# Patient Record
Sex: MALE | Race: WHITE | NOT HISPANIC OR LATINO | URBAN - METROPOLITAN AREA
[De-identification: names, ages, dates, MRNs, and addresses within clinical notes are randomized per-mention and may not be internally consistent; named-entity substitution may affect disease eponyms.]

---

## 2022-12-20 ENCOUNTER — HOSPITAL ENCOUNTER (INPATIENT)
Facility: REHABILITATION | Age: 32
LOS: 8 days | DRG: 560 | End: 2022-12-28
Attending: PHYSICAL MEDICINE & REHABILITATION | Admitting: PHYSICAL MEDICINE & REHABILITATION
Payer: MEDICAID

## 2022-12-20 DIAGNOSIS — Z87.81 S/P LEFT HIP FRACTURE: ICD-10-CM

## 2022-12-20 DIAGNOSIS — Z87.898 HISTORY OF SEIZURES: ICD-10-CM

## 2022-12-20 DIAGNOSIS — G80.8 CEREBRAL PALSY, HEMIPLEGIC (HCC): ICD-10-CM

## 2022-12-20 PROBLEM — U07.1 COVID-19: Status: ACTIVE | Noted: 2022-12-20

## 2022-12-20 PROCEDURE — 700102 HCHG RX REV CODE 250 W/ 637 OVERRIDE(OP): Performed by: PHYSICAL MEDICINE & REHABILITATION

## 2022-12-20 PROCEDURE — 99223 1ST HOSP IP/OBS HIGH 75: CPT | Performed by: PHYSICAL MEDICINE & REHABILITATION

## 2022-12-20 PROCEDURE — 770010 HCHG ROOM/CARE - REHAB SEMI PRIVAT*

## 2022-12-20 PROCEDURE — 8E0ZXY6 ISOLATION: ICD-10-PCS | Performed by: PHYSICAL MEDICINE & REHABILITATION

## 2022-12-20 PROCEDURE — 94760 N-INVAS EAR/PLS OXIMETRY 1: CPT

## 2022-12-20 PROCEDURE — 700111 HCHG RX REV CODE 636 W/ 250 OVERRIDE (IP): Performed by: PHYSICAL MEDICINE & REHABILITATION

## 2022-12-20 PROCEDURE — A9270 NON-COVERED ITEM OR SERVICE: HCPCS | Performed by: PHYSICAL MEDICINE & REHABILITATION

## 2022-12-20 RX ORDER — ACETAMINOPHEN 500 MG
1000 TABLET ORAL EVERY 6 HOURS
Status: DISCONTINUED | OUTPATIENT
Start: 2022-12-20 | End: 2022-12-21

## 2022-12-20 RX ORDER — ARIPIPRAZOLE 10 MG/1
10 TABLET ORAL DAILY
Status: DISCONTINUED | OUTPATIENT
Start: 2022-12-21 | End: 2022-12-28 | Stop reason: HOSPADM

## 2022-12-20 RX ORDER — BENZTROPINE MESYLATE 1 MG/1
2 TABLET ORAL
Status: DISCONTINUED | OUTPATIENT
Start: 2022-12-20 | End: 2022-12-28 | Stop reason: HOSPADM

## 2022-12-20 RX ORDER — OXCARBAZEPINE 300 MG/1
900 TABLET, FILM COATED ORAL DAILY
Status: DISCONTINUED | OUTPATIENT
Start: 2022-12-21 | End: 2022-12-28 | Stop reason: HOSPADM

## 2022-12-20 RX ORDER — OMEPRAZOLE 20 MG/1
20 CAPSULE, DELAYED RELEASE ORAL DAILY
Status: DISCONTINUED | OUTPATIENT
Start: 2022-12-20 | End: 2022-12-28 | Stop reason: HOSPADM

## 2022-12-20 RX ORDER — LEVETIRACETAM 500 MG/5ML
500 INJECTION, SOLUTION, CONCENTRATE INTRAVENOUS
Status: DISCONTINUED | OUTPATIENT
Start: 2022-12-20 | End: 2022-12-28 | Stop reason: HOSPADM

## 2022-12-20 RX ORDER — POLYETHYLENE GLYCOL 3350 17 G/17G
1 POWDER, FOR SOLUTION ORAL
Status: DISCONTINUED | OUTPATIENT
Start: 2022-12-20 | End: 2022-12-28 | Stop reason: HOSPADM

## 2022-12-20 RX ORDER — OXYCODONE HYDROCHLORIDE 5 MG/1
2.5 TABLET ORAL
Status: DISCONTINUED | OUTPATIENT
Start: 2022-12-20 | End: 2022-12-28 | Stop reason: HOSPADM

## 2022-12-20 RX ORDER — ENOXAPARIN SODIUM 100 MG/ML
40 INJECTION SUBCUTANEOUS
Status: DISCONTINUED | OUTPATIENT
Start: 2022-12-20 | End: 2022-12-28 | Stop reason: HOSPADM

## 2022-12-20 RX ORDER — GABAPENTIN 300 MG/1
300 CAPSULE ORAL 3 TIMES DAILY
Status: DISCONTINUED | OUTPATIENT
Start: 2022-12-21 | End: 2022-12-27

## 2022-12-20 RX ORDER — OXYCODONE HYDROCHLORIDE 5 MG/1
5 TABLET ORAL
Status: DISCONTINUED | OUTPATIENT
Start: 2022-12-20 | End: 2022-12-28 | Stop reason: HOSPADM

## 2022-12-20 RX ORDER — ACETAMINOPHEN 500 MG
1000 TABLET ORAL EVERY 6 HOURS PRN
Status: DISCONTINUED | OUTPATIENT
Start: 2022-12-25 | End: 2022-12-21

## 2022-12-20 RX ORDER — ONDANSETRON 2 MG/ML
4 INJECTION INTRAMUSCULAR; INTRAVENOUS 4 TIMES DAILY PRN
Status: DISCONTINUED | OUTPATIENT
Start: 2022-12-20 | End: 2022-12-28 | Stop reason: HOSPADM

## 2022-12-20 RX ORDER — CITALOPRAM 20 MG/1
40 TABLET ORAL DAILY
Status: DISCONTINUED | OUTPATIENT
Start: 2022-12-21 | End: 2022-12-28 | Stop reason: HOSPADM

## 2022-12-20 RX ORDER — BISACODYL 10 MG
10 SUPPOSITORY, RECTAL RECTAL
Status: DISCONTINUED | OUTPATIENT
Start: 2022-12-20 | End: 2022-12-28 | Stop reason: HOSPADM

## 2022-12-20 RX ORDER — AMOXICILLIN 250 MG
2 CAPSULE ORAL 2 TIMES DAILY
Status: DISCONTINUED | OUTPATIENT
Start: 2022-12-20 | End: 2022-12-28 | Stop reason: HOSPADM

## 2022-12-20 RX ORDER — ONDANSETRON 4 MG/1
4 TABLET, ORALLY DISINTEGRATING ORAL 4 TIMES DAILY PRN
Status: DISCONTINUED | OUTPATIENT
Start: 2022-12-20 | End: 2022-12-28 | Stop reason: HOSPADM

## 2022-12-20 RX ORDER — CYCLOBENZAPRINE HCL 10 MG
10 TABLET ORAL 3 TIMES DAILY
Status: DISCONTINUED | OUTPATIENT
Start: 2022-12-20 | End: 2022-12-28 | Stop reason: HOSPADM

## 2022-12-20 RX ORDER — TRAZODONE HYDROCHLORIDE 50 MG/1
50 TABLET ORAL
Status: DISCONTINUED | OUTPATIENT
Start: 2022-12-20 | End: 2022-12-28 | Stop reason: HOSPADM

## 2022-12-20 RX ADMIN — ENOXAPARIN SODIUM 40 MG: 40 INJECTION SUBCUTANEOUS at 21:35

## 2022-12-20 RX ADMIN — CYCLOBENZAPRINE 10 MG: 10 TABLET, FILM COATED ORAL at 21:35

## 2022-12-20 RX ADMIN — TRAZODONE HYDROCHLORIDE 50 MG: 50 TABLET ORAL at 21:34

## 2022-12-20 RX ADMIN — OMEPRAZOLE 20 MG: 20 CAPSULE, DELAYED RELEASE ORAL at 15:42

## 2022-12-20 RX ADMIN — ACETAMINOPHEN 1000 MG: 500 TABLET ORAL at 17:38

## 2022-12-20 RX ADMIN — SENNOSIDES AND DOCUSATE SODIUM 2 TABLET: 50; 8.6 TABLET ORAL at 21:34

## 2022-12-20 RX ADMIN — OXYCODONE 5 MG: 5 TABLET ORAL at 21:34

## 2022-12-20 RX ADMIN — CYCLOBENZAPRINE 10 MG: 10 TABLET, FILM COATED ORAL at 15:42

## 2022-12-20 RX ADMIN — BENZTROPINE MESYLATE 2 MG: 1 TABLET ORAL at 21:34

## 2022-12-20 ASSESSMENT — LIFESTYLE VARIABLES
EVER HAD A DRINK FIRST THING IN THE MORNING TO STEADY YOUR NERVES TO GET RID OF A HANGOVER: NO
HOW MANY TIMES IN THE PAST YEAR HAVE YOU HAD 5 OR MORE DRINKS IN A DAY: 0
CONSUMPTION TOTAL: NEGATIVE
AVERAGE NUMBER OF DAYS PER WEEK YOU HAVE A DRINK CONTAINING ALCOHOL: 0
ALCOHOL_USE: NO
HAVE YOU EVER FELT YOU SHOULD CUT DOWN ON YOUR DRINKING: NO
TOTAL SCORE: 0
TOTAL SCORE: 0
ON A TYPICAL DAY WHEN YOU DRINK ALCOHOL HOW MANY DRINKS DO YOU HAVE: 0
TOTAL SCORE: 0
EVER FELT BAD OR GUILTY ABOUT YOUR DRINKING: NO
HAVE PEOPLE ANNOYED YOU BY CRITICIZING YOUR DRINKING: NO

## 2022-12-20 ASSESSMENT — PATIENT HEALTH QUESTIONNAIRE - PHQ9
1. LITTLE INTEREST OR PLEASURE IN DOING THINGS: NOT AT ALL
SUM OF ALL RESPONSES TO PHQ9 QUESTIONS 1 AND 2: 0
2. FEELING DOWN, DEPRESSED, IRRITABLE, OR HOPELESS: NOT AT ALL

## 2022-12-20 ASSESSMENT — FIBROSIS 4 INDEX: FIB4 SCORE: 0.53

## 2022-12-20 NOTE — H&P
REHABILITATION HISTORY AND PHYSICAL/POST ADMISSION EVALUATION    12/20/2022  11:48 AM  Cheng Foster  [id number] -2   Admission  No admission date for patient encounter.  [facility] Code/Reason for admission: 0008.11 - Orthopaedic Disorders: Status Post Unilateral Hip Fracture   Etiologic diagnosis/problem: S/p left hip fracture  Chief Complaint: Status post left femoral  neck fracture    HPI:  The patient is a 32 y.o. male with a past medical history of seizure disorder, CP with chronic left hemiparesis and spasticity; now admitted for acute inpatient rehabilitation with severe functional debility.  Per documentation, patient sustained a ground-level fall after slipping on ice on 12/13.  Upon evaluation in the emergency department patient had left hip pain.  Images obtained in the emergency department showed a left femoral neck fracture.  Orthopedic surgery was consulted, and patient was taken to the OR for an ORIF of the left hip with percutaneous pinning performed by Dr. Becker on 12/15/2022.  Patient is NWB LLE x6 weeks per Ortho recommendations.  Patient's hospital course has been complicated by mild leukocytosis which resolved on 12/19.  Additionally he has hyponatremia and acute blood loss anemia.  Hemoglobin 12.4 on 12/19.  Patient has been willing to participate with therapy he requires the use of a hemiwalker for transfers.  He has been contact-guard assist for transfers for sit to stand.  Gait has not been attempted since patient is NWB LLE.   At time of discharge from [facility], COVID testing was completed.  Unfortunately patient's COVID test has returned positive.  Patient is still a rehab candidate, his rehab will be done in isolation.    Patient seen and examined at bedside, reports he feel ok. Reports his pain is well controlled. Denies lightheadedness, chest pain, coughing, abdominal pain, loose stools, urinary frequency, fatigue or constipation.  Reports his last BM was today.  Discussed  patient's seizure history with patient and mom.  Per mom, patient's last seizure was approximately a week ago, she reports that his seizures are triggered by emotional distress.  Patient reports that when the patient has a seizure they do not administer any antiepileptics due to the fact that patient is severely allergic to Versed, during last Versed administration patient had a cardiac arrest  Patient's mom reports that they also do not use Ativan for his seizures.  She holds the patient until the seizure subsides.  Reviewed with patient's mom that we will need to add a PRN antiepileptic in the event that patient continues to seize and is not subsiding, will discussed with pharmacy options for PRN  antiepileptic that is not Versed or Ativan    Patient was evaluated by Rehab Medicine physician and Physical Therapy and Occupational Therapy and determined to be appropriate for acute inpatient rehab and was transferred to [facility] on No admission date for patient encounter..    With this acute therapeutic intervention, this patient hopes to improve his functional status, and return to independent living with the supportive care of family.    REVIEW OF SYSTEMS:     A complete review of systems was performed and was negative in detail with the exception of items mentioned elsewhere in this document.    PMH:  History reviewed. No pertinent past medical history.    PSH:  History reviewed. No pertinent surgical history.    History reviewed. No pertinent family history.     MEDICATIONS:  Scheduled Medications   Medication Dose Frequency    Pharmacy Consult Request  1 Each PHARMACY TO DOSE    senna-docusate  2 Tablet BID    omeprazole  20 mg DAILY    acetaminophen  1,000 mg Q6HRS    enoxaparin  40 mg QHS    cyclobenzaprine  10 mg TID    benztropine  2 mg QHS    [START ON 12/21/2022] ARIPiprazole  10 mg DAILY    [START ON 12/21/2022] citalopram  40 mg DAILY    [START ON 12/21/2022] OXcarbazepine  900 mg  "DAILY       ALLERGIES:  Codeine and Versed    PSYCHOSOCIAL HISTORY:  Lives in [place] in a one-story house with one-step to enter.  Lives with his brother and his mom.  LEVEL OF FUNCTION PRIOR TO DISABILTY:  Independent with mobility and ADLs.    LEVEL OF FUNCTION PRIOR TO ADMISSION to [facility]:  PT:  12/1 18 PT note: Contact-guard assist for transfers, contact-guard assist with hemiwalker to transfer to chair.  Gait not assessed.  OT:  12/18 OT note: Min assist for donning and doffing underwear, contact-guard assist for transfers,    SLP:    No SLP note to review    CURRENT LEVEL OF FUNCTION:   Same as level of function prior to admission to [facility]    PHYSICAL EXAM:   Physical Exam:  Vitals: Ht 1.93 m (6' 4\")   Wt 72.6 kg (160 lb 0.9 oz)   Gen: NAD laying comfortably in bed  Head:  NC/AT  Eyes/ Nose/ Mouth: PERRLA, moist mucous membranes  Cardio: RRR, good distal perfusion, warm extremities  Pulm: normal respiratory effort, no cyanosis   Abd: Soft NTND, negative borborygmi   Ext: No peripheral edema. No calf tenderness. No clubbing.  MSK: Left upper extremity with flexion contracture, left lower extremity with equinus foot and PF contracture     Mental status:  A&Ox4 (person, place, date, situation) answers questions appropriately follows commands  Speech: fluent, no aphasia or dysarthria    CRANIAL NERVES:  2,3: visual acuity grossly intact, PERRL  3,4,6: EOMI bilaterally, no nystagmus or diplopia  5: sensation intact to light touch bilaterally and symmetric  7: no facial asymmetry  8: hearing grossly intact      Motor:      Upper Extremity  Myotome R L   Shoulder flexion C5 5/5 3/5   Elbow flexion C5 5/5 3/5   Wrist extension C6 5/5 0/5   Elbow extension C7 5/5 0/5   Finger flexion C8 5/5 0/5   Finger abduction T1 5/5 0/5     Lower Extremity Myotome R L   Hip flexion L2 5/5 2, limited by pain/5   Knee extension L3 5/5 1/5   Ankle dorsiflexion L4 5/5 0/5   Toe " extension L5 5/5 1/5   Ankle plantarflexion S1 5/5 1/5       Sensory:   intact to light touch through out      Tone: MAS 3 left upper extremity biceps, MAS 4 wrist flexors and finger flexors, flexion contracture.  MAS 4 gastroc, plantar flexion contracture    Coordination:   intact finger to nose bilaterally  intact fine motor with fingers bilaterally        RADIOLOGY:    12/13 hip and pelvis x-ray  Minimally displaced fracture of the left femoral neck.    LABS:      12/19/2022 CBC  WBC 6.7  Hemoglobin 12.4  Hematocrit 36.8  Platelets 249    12/16/2022 CMP sodium 137  Potassium 4.0  Creatinine 0.77  Glucose 133  CO2 25  Chloride 105  Calcium 8.0        PRIMARY REHAB DIAGNOSIS:    This patient is a 32 y.o. male admitted for acute inpatient rehabilitation with S/p left hip fracture.    IMPAIRMENTS:   Cognitive  ADLs/IADLs  Mobility    SECONDARY DIAGNOSIS/MEDICAL CO-MORBIDITIES AFFECTING FUNCTION:  Anemia  Hyponatremia  Cerebral palsy  Left-sided hemiplegia with spasticity    RELEVANT CHANGES SINCE PREADMISSION EVALUATION:    Status unchanged    The patient's rehabilitation potential is Excellent  The patient's medical prognosis is excellent    PLAN:   Discussion and Recommendations, discussed with the patient and/or family:   1. The patient requires an acute inpatient rehabilitation program with a coordinated program of care at an intensity and frequency not available at a lower level of care. This recommendation is substantiated by the patient's medical physicians who recommend that the patient's intervention and assessment of medical issues needs to be done at an acute level of care for patient's safety and maximum outcome.     2. A coordinated program of care will be supplied by an interdisciplinary team of physical therapy, occupational therapy, rehab physician, rehab nursing, and, if needed, speech therapy and rehab psychology. Rehab team presents a patient-specific rehabilitation and education program  concentrating on prevention of future problems related to accessibility, mobility, skin, bowel, bladder, sexuality, and psychosocial and medical/surgical problems.     3. Need for Rehabilitation Physician: The rehab physician will be evaluating the patient on a multi-weekly basis to help coordinate the program of care. The rehab physician communicates between medical physicians, therapists, and nurses to maximize the patient's potential outcome. Specific areas in which the rehab physician will be providing daily assessment include the following:   A. Assessing the patient's heart rate and blood pressure response (vitals monitoring) to activity and making adjustments in medications or conservative measures as needed.   B. The rehab physician will be assessing the frequency at which the program can be increased to allow the patient to reach optimal functional outcome.   C. The rehab physician will also provide assessments in daily skin care, especially in light of patient's impairments in mobility.   D. The rehab physician will provide special expertise in understanding how to work with functional impairment and recommend appropriate interventions, compensatory techniques, and education that will facilitate the patient's outcome.     4. Rehab R.N.   The rehab RN will be working with patient to carry over in room mobility and activities of daily living when the patient is not in 3 hours of skilled therapy. Rehab nursing will be working in conjunction with rehab physician to address all the medical issues above and continue to assess laboratory work and discuss abnormalities with the treating physicians, assess vitals, and response to activity, and discuss and report abnormalities with the rehab physician. Rehab RN will also continue daily skin care, supervise bladder/bowel program, instruct in medication administration, and ensure patient safety.     5. Therapies to treat at intensity and frequency of (may change after  completion of evaluation by all therapeutic disciplines):       PT:  Physical therapy to address mobility, transfer, gait training and evaluation for adaptive equipment needs 1hour/day at least 5 days/week for the duration of the ELOS (see below)       OT:  Occupational therapy to address ADLs, self-care, home management training, functional mobility/transfers and assistive device evaluation, and community re-integration 1hour/day at least 5 days/week for the duration of the ELOS (see below).        ST/Dysphagia:  Speech therapy to address speech, language, and cognitive deficits as well as swallowing difficulties with retraining/dysphagia management and community re-integration with comprehension, expression, cognitive training 1hour/day at least 5 days/week for the duration of the ELOS (see below).     6. Medical management / Rehabilitation Issues/Adverse Potential affecting function as part of rehabilitation plan.    Left hip fracture  - Sustained during ground-level fall with a slip on ice  - Status post ORIF percutaneous pinning by Dr. Becker on 12/15/2022  - NWB LLE x6 weeks  - Initiate comprehensive [facility] level therapy with 3 hours of therapy 5 days a week with services from PT/OT    History of seizures  - Continuing patient's home dose Trileptal 900 mg daily  - Patient is allergic to Versed, has anaphylaxis response to benzodiazepines, including cardiac arrest  - Patient's mom tells me that at home if patient has a seizure they the patient until it subsides, they do not last longer than 5 minutes, mom reports history of focal type seizures including staring off and recovering shortly  - I have discussed patient's allergy to benzodiazepines with pharmacy, we will plan for IM Keppra for seizure prophylaxis, unfortunately this is not in stock this evening, will be available 12/21  - In the event the patient has a seizure on the evening of 12/20, the plan will be to keep the patient safe until seizure subsides,  patient's history of seizures are that they are short-lived and less than 5 minutes    COVID-19  - Incidental COVID-19 finding at time of discharge in anticipation for transferring to rehab  - Patient will need to be in isolation x10 days, would be able to be out of isolation on 12/30  - We will plan for patient's rehab to be in isolation for now, is asymptomatic    Cerebral palsy, with spasticity  - Was previously on Flexeril, is tolerating well    History of depression  - Continue patient's home dose Abilify, Celexa  - May benefit from rehab psychology    Acute blood loss anemia  - Hemoglobin 12.4 on 12/19, down from 14.7  - Check admission CBC    Hyponatremia  - Sodium 132 postoperatively  - Sodium improved to 137, recheck BMP at time of admission    Bowel  - Constipation prevention, scheduled senna   - As needed stool softeners    GI prophylaxis: Omeprazole    DVT prophylaxis: Lovenox      I performed a complete drug regimen review and did not identify any potential clinically significant medication issues.    The patient's CODE STATUS was confirmed as Full on admission, with the patient and/or family at bedside.    REHABILITATION ISSUES/ADVERSE POTENTIAL:  1.  Hip fracture: Patient demonstrates functional deficits in strength, balance, coordination, and ADL's. Patient is admitted to [facility] for comprehensive rehabilitation therapy as described below.   Rehabilitation nursing monitors bowel and bladder control, educates on medication administration, co-morbidities and monitors patient safety.    2.  DVT prophylaxis:  Patient is on Lovenox for anticoagulation upon transfer. Encourage OOB. Monitor daily for signs and symptoms of DVT including but not limited to swelling and pain to prevent the development of DVT that may interfere with therapies.    3.  Pain: No issues with pain currently / Controlled with as needed oral analgesics.    4.  Nutrition/Dysphagia: Dietician monitors nutrient  intake, recommend supplements prn and provide nutrition education to pt/family to promote optimal nutrition for wound healing/recovery.     5.  Bladder/bowel:  Start bowel and bladder program, to prevent constipation, urinary retention (which may lead to UTI), and urinary incontinence (which will impact upon pt's functional independence).   - TV Q3h while awake with post void bladder scans, I&O cath for PVRs >400  - up to commode after meal     6.  Skin/dermal ulcer prophylaxis: Monitor for new skin conditions with q.2 h. turns as required to prevent the development of skin breakdown.     7.  GI prophylaxis: Omeprazole to prevent gastritis or GI bleed that would interfere with therapies.    8. Respiratory therapy: RT performs O2 management prn, breathing retraining, pulmonary hygiene and bronchospasm management prn to optimize participation in therapies.    Pt was seen today for 94 min, and entire time spent in face-to-face contact was >50% in counseling and coordination of care as detailed in A/P above.        GOALS/EXPECTED LEVEL OF FUNCTION BASED ON CURRENT MEDICAL AND FUNCTIONAL STATUS (may change based on patient's medical status and rate of impairment recovery):  Mobility/Gait:   Modified Independent, with wheelchair level  ADLs supervision    DISPOSITION: Discharge to pre-morbid independent living setting with the supportive care of patient's family.      ELOS: 10 to 14 days    Kely Villareal D.O.  Physical Medicine and Rehabilitation

## 2022-12-21 LAB
25(OH)D3 SERPL-MCNC: 25 NG/ML (ref 30–100)
ALBUMIN SERPL BCP-MCNC: 4 G/DL (ref 3.2–4.9)
ALBUMIN/GLOB SERPL: 1.3 G/DL
ALP SERPL-CCNC: 110 U/L (ref 30–99)
ALT SERPL-CCNC: 151 U/L (ref 2–50)
ANION GAP SERPL CALC-SCNC: 7 MMOL/L (ref 7–16)
AST SERPL-CCNC: 114 U/L (ref 12–45)
BASOPHILS # BLD AUTO: 0.6 % (ref 0–1.8)
BASOPHILS # BLD: 0.04 K/UL (ref 0–0.12)
BILIRUB SERPL-MCNC: 0.6 MG/DL (ref 0.1–1.5)
BUN SERPL-MCNC: 14 MG/DL (ref 8–22)
CALCIUM ALBUM COR SERPL-MCNC: 9.3 MG/DL (ref 8.5–10.5)
CALCIUM SERPL-MCNC: 9.3 MG/DL (ref 8.5–10.5)
CHLORIDE SERPL-SCNC: 100 MMOL/L (ref 96–112)
CO2 SERPL-SCNC: 29 MMOL/L (ref 20–33)
CREAT SERPL-MCNC: 0.84 MG/DL (ref 0.5–1.4)
EOSINOPHIL # BLD AUTO: 0.3 K/UL (ref 0–0.51)
EOSINOPHIL NFR BLD: 4.7 % (ref 0–6.9)
ERYTHROCYTE [DISTWIDTH] IN BLOOD BY AUTOMATED COUNT: 41.4 FL (ref 35.9–50)
EST. AVERAGE GLUCOSE BLD GHB EST-MCNC: 120 MG/DL
FLUAV RNA SPEC QL NAA+PROBE: NEGATIVE
FLUBV RNA SPEC QL NAA+PROBE: NEGATIVE
GFR SERPLBLD CREATININE-BSD FMLA CKD-EPI: 120 ML/MIN/1.73 M 2
GLOBULIN SER CALC-MCNC: 3.2 G/DL (ref 1.9–3.5)
GLUCOSE SERPL-MCNC: 96 MG/DL (ref 65–99)
HBA1C MFR BLD: 5.8 % (ref 4–5.6)
HCT VFR BLD AUTO: 43.6 % (ref 42–52)
HGB BLD-MCNC: 14.5 G/DL (ref 14–18)
IMM GRANULOCYTES # BLD AUTO: 0.05 K/UL (ref 0–0.11)
IMM GRANULOCYTES NFR BLD AUTO: 0.8 % (ref 0–0.9)
LYMPHOCYTES # BLD AUTO: 1.75 K/UL (ref 1–4.8)
LYMPHOCYTES NFR BLD: 27.2 % (ref 22–41)
MCH RBC QN AUTO: 31.5 PG (ref 27–33)
MCHC RBC AUTO-ENTMCNC: 33.3 G/DL (ref 33.7–35.3)
MCV RBC AUTO: 94.6 FL (ref 81.4–97.8)
MONOCYTES # BLD AUTO: 0.54 K/UL (ref 0–0.85)
MONOCYTES NFR BLD AUTO: 8.4 % (ref 0–13.4)
NEUTROPHILS # BLD AUTO: 3.75 K/UL (ref 1.82–7.42)
NEUTROPHILS NFR BLD: 58.3 % (ref 44–72)
NRBC # BLD AUTO: 0 K/UL
NRBC BLD-RTO: 0 /100 WBC
PLATELET # BLD AUTO: 309 K/UL (ref 164–446)
PMV BLD AUTO: 9.7 FL (ref 9–12.9)
POTASSIUM SERPL-SCNC: 4 MMOL/L (ref 3.6–5.5)
PROT SERPL-MCNC: 7.2 G/DL (ref 6–8.2)
RBC # BLD AUTO: 4.61 M/UL (ref 4.7–6.1)
RSV RNA SPEC QL NAA+PROBE: NEGATIVE
SARS-COV-2 RNA RESP QL NAA+PROBE: NOTDETECTED
SODIUM SERPL-SCNC: 136 MMOL/L (ref 135–145)
SPECIMEN SOURCE: NORMAL
WBC # BLD AUTO: 6.4 K/UL (ref 4.8–10.8)

## 2022-12-21 PROCEDURE — 80053 COMPREHEN METABOLIC PANEL: CPT

## 2022-12-21 PROCEDURE — 83036 HEMOGLOBIN GLYCOSYLATED A1C: CPT

## 2022-12-21 PROCEDURE — 700111 HCHG RX REV CODE 636 W/ 250 OVERRIDE (IP): Performed by: PHYSICAL MEDICINE & REHABILITATION

## 2022-12-21 PROCEDURE — 0241U HCHG SARS-COV-2 COVID-19 NFCT DS RESP RNA 4 TRGT MIC: CPT

## 2022-12-21 PROCEDURE — 97535 SELF CARE MNGMENT TRAINING: CPT

## 2022-12-21 PROCEDURE — 36415 COLL VENOUS BLD VENIPUNCTURE: CPT

## 2022-12-21 PROCEDURE — A9270 NON-COVERED ITEM OR SERVICE: HCPCS | Performed by: PHYSICAL MEDICINE & REHABILITATION

## 2022-12-21 PROCEDURE — 82306 VITAMIN D 25 HYDROXY: CPT

## 2022-12-21 PROCEDURE — 85025 COMPLETE CBC W/AUTO DIFF WBC: CPT

## 2022-12-21 PROCEDURE — 97116 GAIT TRAINING THERAPY: CPT

## 2022-12-21 PROCEDURE — 97166 OT EVAL MOD COMPLEX 45 MIN: CPT

## 2022-12-21 PROCEDURE — 770010 HCHG ROOM/CARE - REHAB SEMI PRIVAT*

## 2022-12-21 PROCEDURE — 97112 NEUROMUSCULAR REEDUCATION: CPT

## 2022-12-21 PROCEDURE — 99233 SBSQ HOSP IP/OBS HIGH 50: CPT | Performed by: PHYSICAL MEDICINE & REHABILITATION

## 2022-12-21 PROCEDURE — 700102 HCHG RX REV CODE 250 W/ 637 OVERRIDE(OP): Performed by: PHYSICAL MEDICINE & REHABILITATION

## 2022-12-21 PROCEDURE — 97162 PT EVAL MOD COMPLEX 30 MIN: CPT

## 2022-12-21 RX ORDER — VITAMIN B COMPLEX
1000 TABLET ORAL DAILY
Status: DISCONTINUED | OUTPATIENT
Start: 2022-12-21 | End: 2022-12-28 | Stop reason: HOSPADM

## 2022-12-21 RX ORDER — ACETAMINOPHEN 500 MG
500 TABLET ORAL EVERY 6 HOURS PRN
Status: DISCONTINUED | OUTPATIENT
Start: 2022-12-21 | End: 2022-12-28 | Stop reason: HOSPADM

## 2022-12-21 RX ADMIN — CYCLOBENZAPRINE 10 MG: 10 TABLET, FILM COATED ORAL at 14:09

## 2022-12-21 RX ADMIN — ACETAMINOPHEN 500 MG: 500 TABLET ORAL at 10:47

## 2022-12-21 RX ADMIN — ARIPIPRAZOLE 10 MG: 10 TABLET ORAL at 08:55

## 2022-12-21 RX ADMIN — Medication 1000 UNITS: at 10:47

## 2022-12-21 RX ADMIN — CITALOPRAM HYDROBROMIDE 40 MG: 20 TABLET ORAL at 08:55

## 2022-12-21 RX ADMIN — OXYCODONE 2.5 MG: 5 TABLET ORAL at 15:51

## 2022-12-21 RX ADMIN — GABAPENTIN 300 MG: 300 CAPSULE ORAL at 14:09

## 2022-12-21 RX ADMIN — OMEPRAZOLE 20 MG: 20 CAPSULE, DELAYED RELEASE ORAL at 08:54

## 2022-12-21 RX ADMIN — ENOXAPARIN SODIUM 40 MG: 40 INJECTION SUBCUTANEOUS at 19:37

## 2022-12-21 RX ADMIN — SENNOSIDES AND DOCUSATE SODIUM 2 TABLET: 50; 8.6 TABLET ORAL at 08:54

## 2022-12-21 RX ADMIN — CYCLOBENZAPRINE 10 MG: 10 TABLET, FILM COATED ORAL at 08:53

## 2022-12-21 RX ADMIN — CYCLOBENZAPRINE 10 MG: 10 TABLET, FILM COATED ORAL at 19:36

## 2022-12-21 RX ADMIN — BENZTROPINE MESYLATE 2 MG: 1 TABLET ORAL at 19:36

## 2022-12-21 RX ADMIN — OXCARBAZEPINE 900 MG: 300 TABLET, FILM COATED ORAL at 08:55

## 2022-12-21 RX ADMIN — ACETAMINOPHEN 1000 MG: 500 TABLET ORAL at 05:46

## 2022-12-21 RX ADMIN — GABAPENTIN 300 MG: 300 CAPSULE ORAL at 08:54

## 2022-12-21 RX ADMIN — GABAPENTIN 300 MG: 300 CAPSULE ORAL at 19:36

## 2022-12-21 SDOH — ECONOMIC STABILITY: TRANSPORTATION INSECURITY
IN THE PAST 12 MONTHS, HAS LACK OF RELIABLE TRANSPORTATION KEPT YOU FROM MEDICAL APPOINTMENTS, MEETINGS, WORK OR FROM GETTING THINGS NEEDED FOR DAILY LIVING?: NO

## 2022-12-21 SDOH — ECONOMIC STABILITY: TRANSPORTATION INSECURITY
IN THE PAST 12 MONTHS, HAS THE LACK OF TRANSPORTATION KEPT YOU FROM MEDICAL APPOINTMENTS OR FROM GETTING MEDICATIONS?: NO

## 2022-12-21 ASSESSMENT — BRIEF INTERVIEW FOR MENTAL STATUS (BIMS)
ASKED TO RECALL BLUE: YES, NO CUE REQUIRED
BIMS SUMMARY SCORE: 15
WHAT MONTH IS IT: ACCURATE WITHIN 5 DAYS
INITIAL REPETITION OF BED BLUE SOCK - FIRST ATTEMPT: 3
ASKED TO RECALL BED: YES, NO CUE REQUIRED
WHAT DAY OF THE WEEK IS IT: CORRECT
ASKED TO RECALL SOCK: YES, NO CUE REQUIRED
WHAT YEAR IS IT: CORRECT

## 2022-12-21 ASSESSMENT — ACTIVITIES OF DAILY LIVING (ADL)
TOILETING: INDEPENDENT
TUB_SHOWER_TRANSFER_DESCRIPTION: ADAPTIVE EQUIPMENT;SHOWER BENCH;INCREASED TIME;SET-UP OF EQUIPMENT;SUPERVISION FOR SAFETY;VERBAL CUEING
TOILET_TRANSFER_DESCRIPTION: ADAPTIVE EQUIPMENT;INCREASED TIME;SET-UP OF EQUIPMENT;SUPERVISION FOR SAFETY;VERBAL CUEING

## 2022-12-21 ASSESSMENT — GAIT ASSESSMENTS
DEVIATION: ANTALGIC;DECREASED BASE OF SUPPORT;BRADYKINETIC
DISTANCE (FEET): 1
ASSISTIVE DEVICE: PLATFORM WALKER
GAIT LEVEL OF ASSIST: MODERATE ASSIST
DISTANCE (FEET): 4
DEVIATION: ANTALGIC;DECREASED BASE OF SUPPORT;BRADYKINETIC
GAIT LEVEL OF ASSIST: MAXIMAL ASSIST
ASSISTIVE DEVICE: PLATFORM WALKER

## 2022-12-21 NOTE — FLOWSHEET NOTE
12/20/22 1855   Protocol Assessment   Initial Assessment Yes   Patient History   Pulmonary Diagnosis None   Procedures Relevant to Respiratory Status None   Home O2 No   Nocturnal CPAP No   Home Treatments/Frequency No   Protocol Pathways   Protocol Pathways None

## 2022-12-21 NOTE — CARE PLAN
Problem: Pain - Standard  Goal: Alleviation of pain or a reduction in pain to the patient’s comfort goal  Flowsheets (Taken 12/21/2022 0800)  Non Verbal Scale:   Calm   Unlabored Breathing  Pain Rating Scale (NPRS): 0     Problem: Psychosocial  Goal: Patient's level of anxiety will decrease  Flowsheets (Taken 12/21/2022 1451)  Decrease Anxiety Level:   Collaborated with patient to identify and develop coping strategies   Implemented stimuli reduction, calming techniques  Patient Behaviors: Anxious  Note: Patient is very anxious this shift, continuously asking about video games and stating that he is bored. Offered things to do, none interest patient.    The patient is Stable - Low risk of patient condition declining or worsening

## 2022-12-21 NOTE — PROGRESS NOTES
Patient admitted to facility at 1500 via wheelchair; accompanied by hospital transport.  Patient assisted to room and positioned in bed for comfort and safety; call light within reach.  Patient assisted with stowing belongings and oriented to room and facility.  Admission assessment performed and documented in computer.  Admission paperwork completed; signed copies placed in chart.  Will continue to monitor.

## 2022-12-21 NOTE — CARE PLAN
The patient is Watcher - Medium risk of patient condition declining or worsening         Progress made toward(s) clinical / shift goals:    Problem: Pain - Standard  Goal: Alleviation of pain or a reduction in pain to the patient’s comfort goal  Note: Oxycodone 5 mg PO given for C/O left upper leg pain with relief. Scheduled Tylenol at midnight held, pt sleeping soundly, no signs of pain.      Problem: Psychosocial  Goal: Patient's level of anxiety will decrease  Outcome: Progressing  Note: Pt does not appear anxious. Calm, pleasant and cooperative during NOC shift,     Problem: Bowel Elimination  Goal: Patient will participate in bowel management program  Outcome: Progressing     Problem: Seizure Precautions  Goal: Implementation of seizure precautions  Note: No episode of seizure noted. Seizure precautions maintained.       Patient is not progressing towards the following goals:

## 2022-12-21 NOTE — THERAPY
Occupational Therapy   Initial Evaluation     Patient Name: Cheng Foster  Age:  32 y.o., Sex:  male  Medical Record #: [id number]  Today's Date: 12/21/2022     Subjective    Pt resting in bed, agreeable to OT evaluation.      Objective       12/21/22 0701   OT Charge Group   Charges Yes   OT Self Care / ADL (Units) 1   OT Evaluation OT Evaluation Mod   OT Total Time Spent   OT Individual Total Time Spent (Mins) 60   Prior Living Situation   Prior Services None;Home-Independent   Housing / Facility 1 Story House  (in [place])   Steps Into Home   (pt reports a couple of steps; states that his brother can install ramp if needed)   Steps In Home 0   Bathroom Set up Bathtub / Shower Combination;Shower Curtain   Equipment Owned None   Lives with - Patient's Self Care Capacity Other (Comments)  (lives with mom and brother)   Comments pt reports that his mom and brother both work, he was independent with ADLs and functional mobility at baseline.   Prior Level of ADL Function   Self Feeding Independent   Grooming / Hygiene Independent   Bathing Independent   Dressing Independent   Toileting Independent   Prior Level of IADL Function   Medication Management Requires Assist   Laundry Requires Assist   Kitchen Mobility Independent   Finances Requires Assist   Home Management Requires Assist   Shopping Requires Assist   Prior Level Of Mobility Independent Without Device in Community;Independent Without Device in Home   Driving / Transportation Relatives / Others Provide Transportation   Occupation (Pre-Hospital Vocational) Not Employed   Leisure Interests Other (Comments)  (video games)   Comments pt shares IADL tasks w/ mom and brother; is alone during the day when they are at work.   Prior Functioning: Everyday Activities   Self Care Independent   Indoor Mobility (Ambulation) Independent   Stairs Independent   Functional Cognition Needed some help   Prior Device Use None of the given options   Cognition    Orientation Level  "Oriented x 4   Level of Consciousness Alert   ABS (Agitated Behavior Scale)   Agitated Behavior Scale Performed No   Cognitive Pattern Assessment   Cognitive Pattern Assessment Used BIMS   Brief Interview for Mental Status (BIMS)   Repetition of Three Words (First Attempt) 3   Temporal Orientation: Year Correct   Temporal Orientation: Month Accurate within 5 days   Temporal Orientation: Day Correct   Recall: \"Sock\" Yes, no cue required   Recall: \"Blue\" Yes, no cue required   Recall: \"Bed\" Yes, no cue required   BIMS Summary Score 15   Confusion Assessment Method (CAM)   Is there evidence of an acute change in mental status from the patient's baseline? No   Inattention Behavior not present   Disorganized thinking Behavior not present   Altered level of consciousness Behavior not present   Vision Screen   Vision Not tested  (reports no changes/difficulty with vision)   Passive ROM Upper Body   Passive ROM Upper Body X   Comments limited at L elbow/wrist/hand 2/2 spasticity/tone; flexion contractures at L wrist and fingers   Active ROM Upper Body   Active ROM Upper Body  X   Dominant Hand Right   Comments limited at L elbow/wrist/hand 2/2 spasticity/tone   Strength Upper Body   Upper Body Strength  X   Comments WFL RUE; 3-4/5 at L shoulder and elbow; limited at L elbow/wrist/hand 2/2 spasticity/tone   Sensation Upper Body   Upper Extremity Sensation  WDL   Upper Body Muscle Tone   Upper Body Muscle Tone  X   Lt Upper Extremity Muscle Tone Spastic   Balance Assessment   Sitting Balance (Static) Fair   Sitting Balance (Dynamic) Fair   Standing Balance (Static) Poor   Standing Balance (Dynamic) Trace +   Weight Shift Sitting Fair   Weight Shift Standing Absent  (NWB LLE)   Bed Mobility    Supine to Sit Contact Guard Assist   Sit to Stand Minimal Assist   Scooting Contact Guard Assist   Rolling Contact Guard Assist   Coordination Upper Body   Coordination X   Comments WFL RUE; impaired LUE 2/2 tone/spasticity   Eating "   Assistance Needed Set-up / clean-up   Physical Assistance Level No physical assistance   CARE Score - Eating 5   Eating Discharge Goal   Discharge Goal 6   Oral Hygiene   Assistance Needed Set-up / clean-up;Supervision   Physical Assistance Level No physical assistance   CARE Score - Oral Hygiene 4   Oral Hygiene Discharge Goal   Discharge Goal 6   Shower/Bathe Self   Assistance Needed Physical assistance   Physical Assistance Level 26%-50%   CARE Score - Shower/Bathe Self 3   Shower/Bathe Self Discharge Goal   Discharge Goal 4   Upper Body Dressing   Assistance Needed Physical assistance   Physical Assistance Level 25% or less   CARE Score - Upper Body Dressing 3   Upper Body Dressing Discharge Goal   Discharge Goal 6   Lower Body Dressing   Assistance Needed Physical assistance   Physical Assistance Level 51%-75%   CARE Score - Lower Body Dressing 2   Lower Body Dressing Discharge Goal   Discharge Goal 6   Putting On/Taking Off Footwear   Assistance Needed Physical assistance   Physical Assistance Level Total assistance   CARE Score - Putting On/Taking Off Footwear 1   Putting On/Taking Off Footwear Discharge Goal   Discharge Goal 6   Toileting Hygiene   Assistance Needed Physical assistance   Physical Assistance Level 51%-75%   CARE Score - Toileting Hygiene 2   Toileting Hygiene Discharge Goal   Discharge Goal 6   Toilet Transfer   Assistance Needed Physical assistance   Physical Assistance Level 25% or less   CARE Score - Toilet Transfer 3   Toilet Transfer Discharge Goal   Discharge Goal 6   Hearing, Speech, and Vision   Ability to Hear Adequate   Ability to See in Adequate Light Adequate   Expression of Ideas and Wants Without difficulty   Understanding Verbal and Non-Verbal Content Understands   Functional Level of Assist   Eating Supervision   Eating Description Set-up of equipment or meal/tube feeding   Grooming Standby Assist;Seated   Grooming Description Increased time;Set-up of equipment;Supervision  for safety;Seated in wheelchair at sink   Bathing Moderate Assist   Bathing Description Assit wtih lower extremities;Increased time;Set-up of equipment;Supervision for safety;Verbal cueing;Tub bench;Assit with back;Assit with perineal   Upper Body Dressing Minimal Assist   Upper Body Dressing Description Assist with closures  (don [place] shirt w/ assist to manage buttons)   Lower Body Dressing Maximal Assist   Lower Body Dressing Description Assist with threading into pant leg;Increased time;Set-up of equipment;Supervision for safety;Verbal cueing  (max assist to don shorts and tread socks)   Toileting Maximal Assist   Toileting Description Assist to pull pants up;Assist to pull pants down;Assist for standing balance;Increased time;Supervision for safety;Verbal cueing   Toilet Transfers Minimal Assist   Toilet Transfer Description Adaptive equipment;Increased time;Set-up of equipment;Supervision for safety;Verbal cueing  (w/c<>toilet stand pivot using julio walker)   Tub / Shower Transfers Minimal Assist   Tub Shower Transfer Description Adaptive equipment;Shower bench;Increased time;Set-up of equipment;Supervision for safety;Verbal cueing  (w/c<>shower bench stand pivot using julio walker)   Comprehension Independent   Expression Independent   Problem Solving Modified Independent   Problem Solving Description Increased time   Memory Independent   Problem List   Problem List Decreased Active Daily Living Skills;Decreased Homemaking Skills;Decreased Upper Extremity Strength Left;Decreased Upper Extremity AROM Left;Decreased Upper Extremity PROM Left;Decreased Functional Mobility;Decreased Activity Tolerance;Impaired Coordination Left Upper Extremity;Impaired Upper Extremity Tone Left;Impaired Postural Control / Balance  (NWB LLE)   Precautions   Precautions Fall Risk;Non Weight Bearing Left Lower Extremity;Other (See Comments)   Comments hx CP w/ chronic L hemiparesis and spasticity, NWB x6 weeks (starting 12/15),  seizure prec, Covid+   Current Discharge Plan   Current Discharge Plan Return to Prior Living Situation   Benefit    Therapy Benefit Patient Would Benefit from Inpatient Rehab Occupational Therapy to Maximize [place] with ADLs, IADLs and Functional Mobility.   Interdisciplinary Plan of Care Collaboration   IDT Collaboration with  Certified Nursing Assistant;Nursing;Physical Therapist   Patient Position at End of Therapy Seated;Self Releasing Lap Belt Applied;Call Light within Reach;Tray Table within Reach;Phone within Reach   Collaboration Comments CLOF, POC   Equipment Needs   Assistive Device / DME Parallel Bars;Tao Walker;Wheelchair;Tub Transfer Bench;Raised Toilet Seat;Grab Bars In Shower / Tub;Grab Bars By Toilet   Adaptive Equipment Reacher;Sock Aide;Dressing Stick;Long Handled Shoe Horn;Long Handled Sponge   Strengths & Barriers   Strengths Able to follow instructions;Alert and oriented;Effective communication skills;Independent prior level of function;Motivated for self care and independence;Pleasant and cooperative;Supportive family;Willingly participates in therapeutic activities   Barriers Decreased endurance;Fatigue;Home accessibility;Impaired activity tolerance;Impaired balance;Pain;Spasticity  (NWB LLE)       Assessment  Patient is 32 y.o. male with a diagnosis of L hip fx after GLF slipping on ice. Pt underwent ORIF of the left hip with percutaneous pinning performed by Dr. Becker on 12/15/2022.  Patient is NWB LLE x6 weeks per Ortho recommendations.    Additional factors influencing patient status / progress (ie: cognitive factors, co-morbidities, social support, etc): Per H&P pt has past medical history of seizure disorder, CP with chronic left hemiparesis and spasticity.      Pt lives in a [facility] with 1-2 AGUILAR in [place] with his mom and brother. He reports independent PLOF for ADLs and functional mobility without AD. He shares IADL tasks with family. Pt was able to be home alone when his mom  and brother are at work. Pt reports that his brother can install a ramp if needed. He has a tub/shower w/ curtain and owns no equipment. Pt reports that he spends most of his days playing video games, and some of his friends have started a  for him while he is in the hospital.     During OT evaluation pt required max A to SBA with ADLs, and min A for stand pivot txfrs from w/c level. He is functioning below his baseline and presents with pain, impaired balance/standing tolerance, decreased endurance, LUE hemiparesis and spasticity w/ wrist and finger flexion contractures. OT services recommended to maximize pt's safety and independence with ADLs and functional mobility prior to d/c home with family support.     Plan  Recommend Occupational Therapy  minutes per day 5-7 days per week for 10 days for the following treatments:  OT Self Care/ADL, OT Community Reintegration, OT Neuro Re-Ed/Balance, OT Therapeutic Activity, OT Evaluation, and OT Therapeutic Exercise.    Passport items to be completed:  Perform bathroom transfers, complete dressing, complete feeding, get ready for the day, prepare a simple meal, participate in household tasks, adapt home for safety needs, demonstrate home exercise program, complete caregiver training     Goals:  Long term and short term goals have been discussed with patient and they are in agreement.    Occupational Therapy Goals (Active)       Problem: Bathing       Dates: Start: 12/21/22         Goal: STG-Within one week, patient will bathe with min A using AE as needed.        Dates: Start: 12/21/22               Problem: Dressing       Dates: Start: 12/21/22         Goal: STG-Within one week, patient will dress LB with min A using AE as needed.        Dates: Start: 12/21/22               Problem: Functional Transfers       Dates: Start: 12/21/22         Goal: STG-Within one week, patient will transfer to tub/shower       Dates: Start: 12/21/22       Description:  (Simulate tub/shower set up) using AD/DME as needed.            Problem: OT Long Term Goals       Dates: Start: 12/21/22         Goal: LTG-By discharge, patient will complete basic self care tasks at supv to mod I level using AE as needed.        Dates: Start: 12/21/22            Goal: LTG-By discharge, patient will perform bathroom transfers at supv to mod I level using AD/DME as needed.        Dates: Start: 12/21/22               Problem: Toileting       Dates: Start: 12/21/22         Goal: STG-Within one week, patient will complete toileting tasks with CGA-SBA using AE as needed.        Dates: Start: 12/21/22

## 2022-12-21 NOTE — THERAPY
Physical Therapy   Initial Evaluation     Patient Name: Cheng Foster  Age:  32 y.o., Sex:  male  Medical Record #: [id number]  Today's Date: 12/21/2022     Subjective    Patient is agreeable to participate, anxious/ perseverative about video games system that may be available with therapy equipment. Refused pain meds from RN, educated re: importance of having pain meds in his system before therapy so he can tolerate more activity.     Objective       12/21/22 0931   PT Charge Group   PT Gait Training (Units) 1   PT Evaluation PT Evaluation Mod   PT Total Time Spent   PT Individual Total Time Spent (Mins) 75   Prior Living Situation   Prior Services None;Home-Independent   Housing / Facility 1 Story House   Steps Into Home 2  (brother is building a ramp. Discussed with mother re: 1 in rise to 12in run for height/ length of ramp)   Bathroom Set up Bathtub / Shower Combination;Shower Curtain  (mother verbalizes she knows she'll need a tub transfer bench)   Equipment Owned Tao Walker;Wheelchair  (wheelchair is older from surgeries patient had when he was 12-15 years old)   Lives with - Patient's Self Care Capacity Other (Comments)  (mom Kena and older brother Brian)   Comments independent with ADLs, mobility at baseline. Denies history of other falls.   Prior Level of Functional Mobility   Bed Mobility Independent   Transfer Status Independent   Ambulation Independent   Distance Ambulation (Feet)   (limited community ambulator)   Assistive Devices Used Tao Walker   Stairs Independent   Prior Functioning: Everyday Activities   Self Care Independent   Indoor Mobility (Ambulation) Independent   Stairs Independent   Functional Cognition Needed some help   Prior Device Use None of the given options   Passive ROM Lower Body   Passive ROM Lower Body WDL   Active ROM Lower Body    Active ROM Lower Body  X   Comments increased tone in LLE due to cerebral palsy, ROM is WFL while non weight bearing/ mostly in wheelchair for  mobility   Strength Lower Body   Lower Body Strength  WDL   Comments limited LLE due to tone/ hemiplegia   Balance Assessment   Sitting Balance (Static) Fair   Sitting Balance (Dynamic) Fair   Standing Balance (Static) Poor   Standing Balance (Dynamic) Trace +   Weight Shift Sitting Fair   Weight Shift Standing Absent  (non weight bearing LLE)   Comments Patient is able to use platform walker, is anxious re: L hip pain with hopping.   Bed Mobility    Supine to Sit Contact Guard Assist   Sit to Supine Minimal Assist   Sit to Stand Contact Guard Assist   Scooting Contact Guard Assist   Rolling Contact Guard Assist   Roll Left and Right   Assistance Needed Physical assistance   Physical Assistance Level 25% or less   CARE Score - Roll Left and Right 3   Roll Left and Right Discharge Goal   Discharge Goal 6   Sit to Lying   Assistance Needed Physical assistance   Physical Assistance Level 25% or less   CARE Score - Sit to Lying 3   Sit to Lying Discharge Goal   Discharge Goal 6   Lying to Sitting on Side of Bed   Assistance Needed Incidental touching   Physical Assistance Level No physical assistance   CARE Score - Lying to Sitting on Side of Bed 4   Lying to Sitting on Side of Bed Discharge Goal   Discharge Goal 6   Sit to Stand   Assistance Needed Incidental touching   Physical Assistance Level No physical assistance   CARE Score - Sit to Stand 4   Sit to Stand Discharge Goal   Discharge Goal 6   Chair/Bed-to-Chair Transfer   Assistance Needed Incidental touching   Physical Assistance Level No physical assistance   CARE Score - Chair/Bed-to-Chair Transfer 4   Chair/Bed-to-Chair Transfer Discharge Goal   Discharge Goal 6   Car Transfer   Reason if not Attempted Safety concerns   CARE Score - Car Transfer 88   Car Transfer Discharge Goal   Discharge Goal 6   Walk 10 Feet   Reason if not Attempted Refused to perform   CARE Score - Walk 10 Feet 7   Walk 10 Feet Discharge Goal   Discharge Goal 6   Walk 50 Feet with Two  Turns   Reason if not Attempted Refused to perform   CARE Score - Walk 50 Feet with Two Turns 7   Walk 50 Feet with Two Turns Discharge Goal   Discharge Goal 6   Walk 150 Feet   Reason if not Attempted Refused to perform   CARE Score - Walk 150 Feet 7   Walk 150 Feet Discharge Goal   Discharge Goal 6   Walking 10 Feet on Uneven Surfaces   Reason if not Attempted Safety concerns   CARE Score - Walking 10 Feet on Uneven Surfaces 88   Walking 10 Feet on Uneven Surfaces Discharge Goal   Discharge Goal 6   1 Step (Curb)   Reason if not Attempted Safety concerns   CARE Score - 1 Step (Curb) 88   1 Step (Curb) Discharge Goal   Discharge Goal 6   4 Steps   Reason if not Attempted Safety concerns   CARE Score - 4 Steps 88   4 Steps Discharge Goal   Discharge Goal 6   12 Steps   Reason if not Attempted Safety concerns   CARE Score - 12 Steps 88   12 Steps Discharge Goal   Discharge Goal 6   Picking Up Object   Reason if not Attempted Safety concerns   CARE Score - Picking Up Object 88   Picking Up Object Discharge Goal   Discharge Goal 6   Wheel 50 Feet with Two Turns   Assistance Needed Supervision   Physical Assistance Level 25% or less   CARE Score - Wheel 50 Feet with Two Turns 3   Wheel 50 Feet with Two Turns Discharge Goal   Discharge Goal 6   Wheel 150 Feet   Assistance Needed Incidental touching   Physical Assistance Level 25% or less   CARE Score - Wheel 150 Feet 3   Wheel 150 Feet Discharge Goal   Discharge Goal 6   Gait Functional Level of Assist    Gait Level Of Assist Maximal Assist   Assistive Device Platform Walker   Distance (Feet) 1   # of Times Distance was Traveled 1   Deviation Antalgic;Decreased Base Of Support;Bradykinetic   Transfer Functional Level of Assist   Bed, Chair, Wheelchair Transfer Contact Guard Assist   Bed Chair Wheelchair Transfer Description Adaptive equipment;Increased time;Squat pivot transfer to wheelchair;Supervision for safety;Verbal cueing;Set-up of equipment   Problem List     Problems Pain;Impaired Bed Mobility;Impaired Transfers;Impaired Ambulation;Functional ROM Deficit;Functional Strength Deficit;Impaired Balance;Decreased Activity Tolerance;Safety Awareness Deficits / Cognition   Precautions   Precautions Fall Risk;Non Weight Bearing Left Lower Extremity   Comments cerebral palsy with chronic  L hemiparesis and spasticity, NWB x6 weeks (starting 12/15), seizure prec, Covid+   Current Discharge Plan   Current Discharge Plan Return to Prior Living Situation   Interdisciplinary Plan of Care Collaboration   IDT Collaboration with  Occupational Therapist   Patient Position at End of Therapy Seated;Self Releasing Lap Belt Applied;Call Light within Reach;Tray Table within Reach;Phone within Reach   Collaboration Comments CLOF, POC   Benefit   Therapy Benefit Patient Would Benefit from Inpatient Rehabilitation Physical Therapy to Maximize Functional [place] with ADLs, IADLs and Mobility.   Strengths & Barriers   Strengths Able to follow instructions;Independent prior level of function;Motivated for self care and independence;Pleasant and cooperative;Supportive family;Willingly participates in therapeutic activities   Barriers Impaired activity tolerance;Impaired balance;Pain;Spasticity;Generalized weakness       Assessment  Patient is 32 y.o. male with a diagnosis of L hip fracture requiring ORIF and NWB LLE x 6 weeks starting 12/15/21.  Additional factors influencing patient status / progress (ie: cognitive factors, co-morbidities, social support, etc): history of seizures, cerebral palsy with L sided hemiplegia with increased tone, multiple L ankle surgeries. Previously independent with ADLs, ambulation. Lives with his mother and older brother. Brother is planning to build a ramp to allow patient into home with wheelchair.       Plan  Recommend Physical Therapy  minutes per day 5-7 days per week for 7-10 days for the following treatments:  PT Gait Training, PT Self Care/Home  Eval, PT Therapeutic Exercises, PT Neuro Re-Ed/Balance, PT Therapeutic Activity, and PT Evaluation.    Passport items to be completed:  Get in/out of bed safely, in/out of a vehicle, safely use mobility device, walk or wheel around home/community, navigate up and down stairs, show how to get up/down from the ground, ensure home is accessible, demonstrate HEP, complete caregiver training    Goals:  Long term and short term goals have been discussed with patient and they are in agreement.    Physical Therapy Problems (Active)       Problem: Balance       Dates: Start: 12/21/22         Goal: STG-Within one week, patient will maintain static standing on carpet/ foam with platform walker x 2 minutes       Dates: Start: 12/21/22               Problem: Mobility       Dates: Start: 12/21/22         Goal: STG-Within one week, patient will ambulate 50 ft with platform walker and contact guard assist       Dates: Start: 12/21/22            Goal: STG-Within one week, patient will ambulate up/down a curb with platform walker and contact guard assist       Dates: Start: 12/21/22               Problem: Mobility Transfers       Dates: Start: 12/21/22         Goal: STG-Within one week, patient will transfer bed to chair with julio walker and supervision assist       Dates: Start: 12/21/22               Problem: PT-Long Term Goals       Dates: Start: 12/21/22         Goal: LTG-By discharge, patient will ambulate 75 ft with platform walker and supervision assist       Dates: Start: 12/21/22            Goal: LTG-By discharge, patient will perform home exercise program from handouts with minimal cuing       Dates: Start: 12/21/22            Goal: LTG-By discharge, patient will transfer in/out of a car with supervision assist and LRAD       Dates: Start: 12/21/22

## 2022-12-21 NOTE — FLOWSHEET NOTE
12/20/22 7505   Events/Summary/Plan   Events/Summary/Plan RT Assessment   Vital Signs   Pulse (!) 105   Respiration 18   Pulse Oximetry 92 %   $ Pulse Oximetry (Spot Check) Yes   Oxygen   O2 Delivery Device None - Room Air

## 2022-12-21 NOTE — THERAPY
Occupational Therapy  Daily Treatment     Patient Name: Cheng Foster  Age:  32 y.o., Sex:  male  Medical Record #: [id number]  Today's Date: 12/21/2022     Precautions  Precautions: (P) Fall Risk, Non Weight Bearing Left Lower Extremity  Comments: (P) hx CP w/ chronic L hemiparesis and spasticity, NWB x6 weeks (starting 12/15), seizure prec, Covid+         Subjective    Pt seated up in w/c, agreeable to OT session.      Objective       12/21/22 1101   OT Charge Group   OT Self Care / ADL (Units) 1   OT Neuromuscular Re-education / Balance (Units) 1   OT Total Time Spent   OT Individual Total Time Spent (Mins) 30   Precautions   Precautions Fall Risk;Non Weight Bearing Left Lower Extremity   Comments hx CP w/ chronic L hemiparesis and spasticity, NWB x6 weeks (starting 12/15), seizure prec, Covid+   Cognition    Level of Consciousness Alert   Sitting Lower Body Exercises   Sit to Stand 2 sets of 10  (w/c<>platform walker, CGA-Min A)   Interdisciplinary Plan of Care Collaboration   Patient Position at End of Therapy Seated;Self Releasing Lap Belt Applied;Call Light within Reach;Tray Table within Reach;Phone within Reach     Introduced AE for LB ADLs, including reacher, dressing stick, LH shoe horn and sock aid (LH sponges out of stock). Pt able to don R shoe using LH shoe horn with supv/set up. He was agreeable to trial other AE during upcoming session.     Attempted to set up Nintendo Switch for leisure participation, however unit was not charged.     Discussed benefit of taking scheduled pain medications for improved tolerance for therapy.     Assessment    Pt tolerated OT session well. He was receptive to education for AE for LB ADLs, and able to don R shoe with SBA/set up and LH reacher. He will require hands-on practice with other AE. Pt progressing with STS transitions, con't to be limited by pain, spasticity, and NWB LLE.   Strengths: Able to follow instructions, Alert and oriented, Effective communication  skills, Independent prior level of function, Motivated for self care and independence, Pleasant and cooperative, Supportive family, Willingly participates in therapeutic activities  Barriers: Decreased endurance, Fatigue, Home accessibility, Impaired activity tolerance, Impaired balance, Pain, Spasticity (NWB LLE)    Plan    ADLs w/ AE education, functional transfers, sit<>stands, balance/standing tolerance, endurance.     Passport items to be completed:  Perform bathroom transfers, complete dressing, complete feeding, get ready for the day, prepare a simple meal, participate in household tasks, adapt home for safety needs, demonstrate home exercise program, complete caregiver training     Occupational Therapy Goals (Active)       Problem: Bathing       Dates: Start: 12/21/22         Goal: STG-Within one week, patient will bathe with min A using AE as needed.        Dates: Start: 12/21/22               Problem: Dressing       Dates: Start: 12/21/22         Goal: STG-Within one week, patient will dress LB with min A using AE as needed.        Dates: Start: 12/21/22               Problem: Functional Transfers       Dates: Start: 12/21/22         Goal: STG-Within one week, patient will transfer to tub/shower       Dates: Start: 12/21/22       Description: (Simulate tub/shower set up) using AD/DME as needed.            Problem: OT Long Term Goals       Dates: Start: 12/21/22         Goal: LTG-By discharge, patient will complete basic self care tasks at supv to mod I level using AE as needed.        Dates: Start: 12/21/22            Goal: LTG-By discharge, patient will perform bathroom transfers at supv to mod I level using AD/DME as needed.        Dates: Start: 12/21/22               Problem: Toileting       Dates: Start: 12/21/22         Goal: STG-Within one week, patient will complete toileting tasks with CGA-SBA using AE as needed.        Dates: Start: 12/21/22

## 2022-12-21 NOTE — PROGRESS NOTES
"Rehab Progress Note     Encounter Date: 12/21/2022    CC: s/p L hip fracture     Interval Events (Subjective)  Vitals reviewed: WNL.  Labs reviewed: Elevated LFTs.  , .  Vitamin D 25.  Patient seen and examined in room.  Patient reports he is bored in his room.  Is asking about videogames.  Discussed with patient that we will find some games for him.  Patient remains in COVID isolation is unable to go to the gym.  Patient reports he has some left-sided hip pain.  Reviewed adding gabapentin which would lower his seizure threshold as well as help with pain.  Patient agreeable.  Starting patient on vitamin D supplement.  Otherwise has no complaints and reports he slept well    Objective:  Physical Exam:  Vitals: /68   Pulse 68   Temp 36.7 °C (98.1 °F) (Oral)   Resp 20   Ht 1.93 m (6' 4\")   Wt 72.6 kg (160 lb 0.9 oz)   SpO2 96%   Gen: NAD, seated comfortably in manual wheelchair  Head:  NC/AT  Eyes/ Nose/ Mouth: PERRLA, moist mucous membranes  Cardio: RRR, good distal perfusion, warm extremities  Pulm: normal respiratory effort, no cyanosis   Abd: Soft NTND, negative borborygmi   Ext: No peripheral edema. No calf tenderness. No clubbing.  Neuro: Left upper extremity flexion contracture, left lower extremity plantar flexion contracture.    Mental status:  A&Ox4 (person, place, date, situation) answers questions appropriately follows commands  Speech: fluent, no aphasia or dysarthria      Recent Results (from the past 72 hour(s))   COMPLETE CBC & AUTO DIFF WBC    Collection Time: 12/19/22  5:28 AM   Result Value Ref Range    Leukocytes, Absolute 6.7 3.7 - 10.6 x10E3/uL    RBC 4.05 (L) 4.50 - 5.70 x10e6/uL    Hemoglobin 12.4 (L) 13.0 - 16.7 g/dL    Hematocrit 36.8 (L) 38.8 - 49.7 %    MCV 91.0 83.0 - 99.0 fL    MCH 30.7 28.0 - 33.8 pg    MCHC 33.8 33.1 - 36.5 g/dL    RDW 12.4 11.8 - 14.0 %    Platelet Count 249 146 - 390 x10E3/uL    MPV 8.1 6.4 - 10.2 fL    Neutrophils-Polys 61.1 41.7 - 82.3 %    " Lymphocytes 24.5 10.8 - 44.4 %    Monocytes 9.0 5.0 - 12.8 %    Eosinophils 4.6 0.0 - 6.6 %    Basophils 0.8 0.0 - 1.3 %    Neutrophils (Absolute) 4.10 1.40 - 8.00 x10E3/uL    Lymphs (Absolute) 1.60 1.00 - 5.20 x10E3/uL    Monos (Absolute) 0.60 0.16 - 1.00 x10E3/uL    Eos (Absolute) 0.30 0.00 - 0.80 x10E3/uL    Baso (Absolute) 0.10 0.00 - 0.30 x10E3/uL   CBC with Differential    Collection Time: 12/21/22  6:33 AM   Result Value Ref Range    WBC 6.4 4.8 - 10.8 K/uL    RBC 4.61 (L) 4.70 - 6.10 M/uL    Hemoglobin 14.5 14.0 - 18.0 g/dL    Hematocrit 43.6 42.0 - 52.0 %    MCV 94.6 81.4 - 97.8 fL    MCH 31.5 27.0 - 33.0 pg    MCHC 33.3 (L) 33.7 - 35.3 g/dL    RDW 41.4 35.9 - 50.0 fL    Platelet Count 309 164 - 446 K/uL    MPV 9.7 9.0 - 12.9 fL    Neutrophils-Polys 58.30 44.00 - 72.00 %    Lymphocytes 27.20 22.00 - 41.00 %    Monocytes 8.40 0.00 - 13.40 %    Eosinophils 4.70 0.00 - 6.90 %    Basophils 0.60 0.00 - 1.80 %    Immature Granulocytes 0.80 0.00 - 0.90 %    Nucleated RBC 0.00 /100 WBC    Neutrophils (Absolute) 3.75 1.82 - 7.42 K/uL    Lymphs (Absolute) 1.75 1.00 - 4.80 K/uL    Monos (Absolute) 0.54 0.00 - 0.85 K/uL    Eos (Absolute) 0.30 0.00 - 0.51 K/uL    Baso (Absolute) 0.04 0.00 - 0.12 K/uL    Immature Granulocytes (abs) 0.05 0.00 - 0.11 K/uL    NRBC (Absolute) 0.00 K/uL   Comp Metabolic Panel (CMP)    Collection Time: 12/21/22  6:33 AM   Result Value Ref Range    Sodium 136 135 - 145 mmol/L    Potassium 4.0 3.6 - 5.5 mmol/L    Chloride 100 96 - 112 mmol/L    Co2 29 20 - 33 mmol/L    Anion Gap 7.0 7.0 - 16.0    Glucose 96 65 - 99 mg/dL    Bun 14 8 - 22 mg/dL    Creatinine 0.84 0.50 - 1.40 mg/dL    Calcium 9.3 8.5 - 10.5 mg/dL    AST(SGOT) 114 (H) 12 - 45 U/L    ALT(SGPT) 151 (H) 2 - 50 U/L    Alkaline Phosphatase 110 (H) 30 - 99 U/L    Total Bilirubin 0.6 0.1 - 1.5 mg/dL    Albumin 4.0 3.2 - 4.9 g/dL    Total Protein 7.2 6.0 - 8.2 g/dL    Globulin 3.2 1.9 - 3.5 g/dL    A-G Ratio 1.3 g/dL   Vitamin D,  25-hydroxy (blood)    Collection Time: 12/21/22  6:33 AM   Result Value Ref Range    25-Hydroxy   Vitamin D 25 25 (L) 30 - 100 ng/mL   CORRECTED CALCIUM    Collection Time: 12/21/22  6:33 AM   Result Value Ref Range    Correct Calcium 9.3 8.5 - 10.5 mg/dL   ESTIMATED GFR    Collection Time: 12/21/22  6:33 AM   Result Value Ref Range    GFR (CKD-EPI) 120 >60 mL/min/1.73 m 2       Current Facility-Administered Medications   Medication Frequency    acetaminophen (TYLENOL) tablet 500 mg Q6HRS PRN    Respiratory Therapy Consult Continuous RT    Pharmacy Consult Request ...Pain Management Review 1 Each PHARMACY TO DOSE    senna-docusate (PERICOLACE or SENOKOT S) 8.6-50 MG per tablet 2 Tablet BID    And    polyethylene glycol/lytes (MIRALAX) PACKET 1 Packet QDAY PRN    And    magnesium hydroxide (MILK OF MAGNESIA) suspension 30 mL QDAY PRN    And    bisacodyl (DULCOLAX) suppository 10 mg QDAY PRN    omeprazole (PRILOSEC) capsule 20 mg DAILY    ondansetron (ZOFRAN ODT) dispertab 4 mg 4X/DAY PRN    Or    ondansetron (ZOFRAN) syringe/vial injection 4 mg 4X/DAY PRN    traZODone (DESYREL) tablet 50 mg QHS PRN    oxyCODONE immediate-release (ROXICODONE) tablet 2.5 mg Q3HRS PRN    Or    oxyCODONE immediate-release (ROXICODONE) tablet 5 mg Q3HRS PRN    enoxaparin (Lovenox) inj 40 mg QHS    cyclobenzaprine (Flexeril) tablet 10 mg TID    benztropine (COGENTIN) tablet 2 mg QHS    ARIPiprazole (Abilify) tablet 10 mg DAILY    citalopram (CeleXA) tablet 40 mg DAILY    OXcarbazepine (TRILEPTAL) tablet 900 mg DAILY    levETIRAcetam (Keppra) injection 500 mg QDAY PRN    gabapentin (NEURONTIN) capsule 300 mg TID       Orders Placed This Encounter   Procedures    Diet Order Diet: Regular     Standing Status:   Standing     Number of Occurrences:   1     Order Specific Question:   Diet:     Answer:   Regular [1]       Assessment:  Active Hospital Problems    Diagnosis     *S/p left hip fracture     History of seizures     COVID-19     Muscle  spasm of left lower extremity     Cerebral palsy, hemiplegic (HCC)        Medical Decision Making and Plan:  Left hip fracture  - Sustained during ground-level fall with a slip on ice  - Status post ORIF percutaneous pinning by Dr. Becker on 12/15/2022  - NWB LLE x6 weeks  - Initiate comprehensive IRF level therapy with 3 hours of therapy 5 days a week with services from PT/OT     History of seizures  - Continuing patient's home dose Trileptal 900 mg daily  - Patient is allergic to Versed, has anaphylaxis response to benzodiazepines, including cardiac arrest  - mom reportsif patient has a seizure they hold the patient until it subsides, they do not last longer than 5 minutes  - mom reports history of focal type seizures including staring off and recovering shortly  - I have discussed patient's allergy to benzodiazepines with pharmacy, we will plan for IV Keppra for seizures that are uncontrollable     COVID-19  - Incidental COVID-19 finding at time of discharge in anticipation for transferring to rehab  - Patient will need to be in isolation x10 days, would be able to be out of isolation on 12/30  - We will plan for patient's rehab to be in isolation for now, is asymptomatic     Cerebral palsy, with spasticity  - Was previously on Flexeril, is tolerating well     History of depression  - Continue patient's home dose Abilify, Celexa  - May benefit from rehab psychology     Transaminitis  -Elevated LFTs on admission labs  - changed to scheduled Tylenol to Tylenol 500 mg as needed  - Recheck labs tomorrow    Acute blood loss anemia  - Hemoglobin 12.4 on 12/19, down from 14.7  - 12/21 hemoglobin 14.5     Hyponatremia  - Sodium 132 postoperatively  - 12/21 sodium 136    Pain  - 12/21 started gabapentin 300 3 times daily for pain and to lower seizure threshold  - Tylenol as needed, oxycodone as needed     Bowel  - Constipation prevention, scheduled senna   - As needed stool softeners     GI prophylaxis: Omeprazole      DVT prophylaxis: Lovenox    Total time:  35 minutes.  I spent greater than 50% of the time for patient care and coordination on this date, including unit/floor time, and face-to-face time with the patient as per assessment and plan above including.    Kely Villareal D.O.

## 2022-12-21 NOTE — IDT DISCHARGE PLANNING
CASE MANAGEMENT INITIAL ASSESSMENT    Admit Date:  12/20/2022     CM reviewed the medical chart and will meet with the patient to discuss role of case management / discharge planning / team conference.       Diagnosis: S/p left hip fracture [Z87.81]    Co-morbidities:   Patient Active Problem List    Diagnosis Date Noted    S/p left hip fracture 12/20/2022    History of seizures 12/20/2022    COVID-19 12/20/2022    Muscle spasm of left lower extremity 05/16/2016    Cerebral palsy, hemiplegic (HCC) 11/17/2015     Prior Living Situation:  Housing / Facility: [address]  Lives with - Patient's Self Care Capacity: Other (Comments) (mom Kena and older brother Brian)    Prior Level of Function:  Medication Management: Requires Assist  Finances: Requires Assist  Home Management: Requires Assist  Shopping: Requires Assist  Prior Level Of Mobility: Independent Without Device in Community, Independent Without Device in Home  Driving / Transportation: Relatives / Others Provide Transportation    Support Systems:  Primary : Mother         Previous Services Utilized:   Equipment Owned: Tao Walker, Wheelchair (wheelchair is older from surgeries patient had when he was 12-15 years old)  Prior Services: None, Home-Independent    Other Information:  Occupation (Pre-Hospital Vocational): Not Employed     Primary Payor Source: Medicaid  Primary Care Practitioner : Elly Chaudhary    Patient / Family Goal:       Plan:  1. Continue to follow patient through hospitalization and provide discharge planning in collaboration with patient, family, physicians and ancillary services.     2. Utilize community resources to ensure a safe discharge.

## 2022-12-22 LAB
ALBUMIN SERPL BCP-MCNC: 3.7 G/DL (ref 3.2–4.9)
ALBUMIN/GLOB SERPL: 1.1 G/DL
ALP SERPL-CCNC: 109 U/L (ref 30–99)
ALT SERPL-CCNC: 156 U/L (ref 2–50)
ANION GAP SERPL CALC-SCNC: 10 MMOL/L (ref 7–16)
AST SERPL-CCNC: 87 U/L (ref 12–45)
BILIRUB SERPL-MCNC: 0.6 MG/DL (ref 0.1–1.5)
BUN SERPL-MCNC: 15 MG/DL (ref 8–22)
CALCIUM ALBUM COR SERPL-MCNC: 9.2 MG/DL (ref 8.5–10.5)
CALCIUM SERPL-MCNC: 9 MG/DL (ref 8.5–10.5)
CHLORIDE SERPL-SCNC: 101 MMOL/L (ref 96–112)
CO2 SERPL-SCNC: 23 MMOL/L (ref 20–33)
CREAT SERPL-MCNC: 0.6 MG/DL (ref 0.5–1.4)
GFR SERPLBLD CREATININE-BSD FMLA CKD-EPI: 133 ML/MIN/1.73 M 2
GLOBULIN SER CALC-MCNC: 3.3 G/DL (ref 1.9–3.5)
GLUCOSE SERPL-MCNC: 98 MG/DL (ref 65–99)
POTASSIUM SERPL-SCNC: 4.4 MMOL/L (ref 3.6–5.5)
PROT SERPL-MCNC: 7 G/DL (ref 6–8.2)
SODIUM SERPL-SCNC: 134 MMOL/L (ref 135–145)

## 2022-12-22 PROCEDURE — 97530 THERAPEUTIC ACTIVITIES: CPT

## 2022-12-22 PROCEDURE — 36415 COLL VENOUS BLD VENIPUNCTURE: CPT

## 2022-12-22 PROCEDURE — 97535 SELF CARE MNGMENT TRAINING: CPT

## 2022-12-22 PROCEDURE — 700111 HCHG RX REV CODE 636 W/ 250 OVERRIDE (IP): Performed by: PHYSICAL MEDICINE & REHABILITATION

## 2022-12-22 PROCEDURE — 99233 SBSQ HOSP IP/OBS HIGH 50: CPT | Performed by: PHYSICAL MEDICINE & REHABILITATION

## 2022-12-22 PROCEDURE — 97116 GAIT TRAINING THERAPY: CPT

## 2022-12-22 PROCEDURE — 80053 COMPREHEN METABOLIC PANEL: CPT

## 2022-12-22 PROCEDURE — 700102 HCHG RX REV CODE 250 W/ 637 OVERRIDE(OP): Performed by: PHYSICAL MEDICINE & REHABILITATION

## 2022-12-22 PROCEDURE — 770010 HCHG ROOM/CARE - REHAB SEMI PRIVAT*

## 2022-12-22 PROCEDURE — 97112 NEUROMUSCULAR REEDUCATION: CPT

## 2022-12-22 PROCEDURE — A9270 NON-COVERED ITEM OR SERVICE: HCPCS | Performed by: PHYSICAL MEDICINE & REHABILITATION

## 2022-12-22 RX ADMIN — CYCLOBENZAPRINE 10 MG: 10 TABLET, FILM COATED ORAL at 21:16

## 2022-12-22 RX ADMIN — SENNOSIDES AND DOCUSATE SODIUM 2 TABLET: 50; 8.6 TABLET ORAL at 08:17

## 2022-12-22 RX ADMIN — Medication 1000 UNITS: at 08:17

## 2022-12-22 RX ADMIN — OXCARBAZEPINE 900 MG: 300 TABLET, FILM COATED ORAL at 08:16

## 2022-12-22 RX ADMIN — CYCLOBENZAPRINE 10 MG: 10 TABLET, FILM COATED ORAL at 08:16

## 2022-12-22 RX ADMIN — GABAPENTIN 300 MG: 300 CAPSULE ORAL at 08:17

## 2022-12-22 RX ADMIN — ACETAMINOPHEN 500 MG: 500 TABLET ORAL at 23:53

## 2022-12-22 RX ADMIN — CITALOPRAM HYDROBROMIDE 40 MG: 20 TABLET ORAL at 08:17

## 2022-12-22 RX ADMIN — BENZTROPINE MESYLATE 2 MG: 1 TABLET ORAL at 21:16

## 2022-12-22 RX ADMIN — OMEPRAZOLE 20 MG: 20 CAPSULE, DELAYED RELEASE ORAL at 08:16

## 2022-12-22 RX ADMIN — ACETAMINOPHEN 500 MG: 500 TABLET ORAL at 11:22

## 2022-12-22 RX ADMIN — ENOXAPARIN SODIUM 40 MG: 40 INJECTION SUBCUTANEOUS at 21:17

## 2022-12-22 RX ADMIN — GABAPENTIN 300 MG: 300 CAPSULE ORAL at 14:05

## 2022-12-22 RX ADMIN — ARIPIPRAZOLE 10 MG: 10 TABLET ORAL at 08:17

## 2022-12-22 RX ADMIN — SENNOSIDES AND DOCUSATE SODIUM 2 TABLET: 50; 8.6 TABLET ORAL at 21:16

## 2022-12-22 RX ADMIN — GABAPENTIN 300 MG: 300 CAPSULE ORAL at 21:16

## 2022-12-22 RX ADMIN — CYCLOBENZAPRINE 10 MG: 10 TABLET, FILM COATED ORAL at 14:05

## 2022-12-22 ASSESSMENT — PAIN DESCRIPTION - PAIN TYPE: TYPE: ACUTE PAIN;SURGICAL PAIN

## 2022-12-22 ASSESSMENT — ACTIVITIES OF DAILY LIVING (ADL)
BED_CHAIR_WHEELCHAIR_TRANSFER_DESCRIPTION: ADAPTIVE EQUIPMENT;INCREASED TIME;SUPERVISION FOR SAFETY;VERBAL CUEING
TUB_SHOWER_TRANSFER_DESCRIPTION: ADAPTIVE EQUIPMENT;GRAB BAR;SHOWER BENCH;INCREASED TIME;SET-UP OF EQUIPMENT;SUPERVISION FOR SAFETY;VERBAL CUEING
TOILET_TRANSFER_DESCRIPTION: ADAPTIVE EQUIPMENT;GRAB BAR;INCREASED TIME;SUPERVISION FOR SAFETY;VERBAL CUEING

## 2022-12-22 ASSESSMENT — GAIT ASSESSMENTS
DEVIATION: DECREASED BASE OF SUPPORT
GAIT LEVEL OF ASSIST: MODERATE ASSIST
ASSISTIVE DEVICE: PLATFORM WALKER

## 2022-12-22 NOTE — THERAPY
Physical Therapy   Daily Treatment     Patient Name: Cheng Foster  Age:  32 y.o., Sex:  male  Medical Record #: [id number]  Today's Date: 12/22/2022     Precautions  Precautions: (P) Fall Risk, Non Weight Bearing Left Lower Extremity  Comments: (P) hx CP w/ chronic L hemiparesis and spasticity, NWB x6 weeks (starting 12/15), seizure prec    Subjective    Patient in bed and agreeable to therapy, initiated returning Nintendo Switch to therapist.     Objective       12/22/22 1301   PT Charge Group   PT Therapeutic Activities (Units) 2   PT Total Time Spent   PT Individual Total Time Spent (Mins) 30   Precautions   Precautions Fall Risk;Non Weight Bearing Left Lower Extremity   Comments hx CP w/ chronic L hemiparesis and spasticity, NWB x6 weeks (starting 12/15), seizure prec   Pain 0 - 10 Group   Location Leg   Location Orientation Left   Wheelchair Functional Level of Assist   Wheelchair Assist Stand by Assist   Distance Wheelchair (Feet or Distance) 400   Wheelchair Description Extra time;Supervision for safety;Verbal cueing  (uneven and on ramps)   Transfer Functional Level of Assist   Bed, Chair, Wheelchair Transfer Contact Guard Assist   Bed Chair Wheelchair Transfer Description Adaptive equipment;Increased time;Supervision for safety;Verbal cueing  (stand hop/pivot with platform walker)   Bed Mobility    Supine to Sit Standby Assist   Sit to Supine Standby Assist   Sit to Stand Contact Guard Assist   Scooting Contact Guard Assist   Rolling Standby Assist   Interdisciplinary Plan of Care Collaboration   IDT Collaboration with  Occupational Therapist;Physical Therapist   Patient Position at End of Therapy Seated;Chair Alarm On;Self Releasing Lap Belt Applied;Other (Comments)  (in therapy gym)   Collaboration Comments CLOF/POC with primary team; handoff of care to OT.     Outdoor w/c mobility using R julio technique with SBA, verbal cues for anterior weight shift when propelling up inclines.     Bed mobility on  standard bed with bed rail, patient reports getting in on left side of bed (when facing head board) and that it is a twin bed.    Assessment    Patient tolerated session well, agreeable to all therapeutic activities. Demonstrating good safety and pacing with outdoor wheelchair mobility.     Strengths: Able to follow instructions, Independent prior level of function, Motivated for self care and independence, Pleasant and cooperative, Supportive family, Willingly participates in therapeutic activities  Barriers: Impaired activity tolerance, Impaired balance, Pain, Spasticity, Generalized weakness    Plan    Gait with platform walker, standing tolerance, standing balance, transfer safety     Passport items to be completed:  Get in/out of bed safely, in/out of a vehicle, safely use mobility device, walk or wheel around home/community, navigate up and down stairs, show how to get up/down from the ground, ensure home is accessible, demonstrate HEP, complete caregiver training      Physical Therapy Problems (Active)       Problem: Balance       Dates: Start: 12/21/22         Goal: STG-Within one week, patient will maintain static standing on carpet/ foam with platform walker x 2 minutes       Dates: Start: 12/21/22               Problem: Mobility       Dates: Start: 12/21/22         Goal: STG-Within one week, patient will ambulate 50 ft with platform walker and contact guard assist       Dates: Start: 12/21/22            Goal: STG-Within one week, patient will ambulate up/down a curb with platform walker and contact guard assist       Dates: Start: 12/21/22               Problem: Mobility Transfers       Dates: Start: 12/21/22         Goal: STG-Within one week, patient will transfer bed to chair with julio walker and supervision assist       Dates: Start: 12/21/22               Problem: PT-Long Term Goals       Dates: Start: 12/21/22         Goal: LTG-By discharge, patient will ambulate 75 ft with platform walker and  supervision assist       Dates: Start: 12/21/22            Goal: LTG-By discharge, patient will perform home exercise program from handouts with minimal cuing       Dates: Start: 12/21/22            Goal: LTG-By discharge, patient will transfer in/out of a car with supervision assist and LRAD       Dates: Start: 12/21/22

## 2022-12-22 NOTE — THERAPY
Occupational Therapy  Daily Treatment     Patient Name: Cheng Foster  Age:  32 y.o., Sex:  male  Medical Record #: [id number]  Today's Date: 12/22/2022     Precautions  Precautions: Fall Risk, Non Weight Bearing Left Lower Extremity  Comments: hx CP w/ chronic L hemiparesis and spasticity, NWB x6 weeks (starting 12/15), seizure prec    Subjective    Received patient in main therapy gym, seated in w/c after completing PT session. Agreeable to participate in OT.      Objective     12/22/22 1331   OT Charge Group   OT Self Care / ADL (Units) 1   OT Neuromuscular Re-education / Balance (Units) 1   OT Total Time Spent   OT Individual Total Time Spent (Mins) 30   Precautions   Precautions Fall Risk;Non Weight Bearing Left Lower Extremity   Comments hx CP w/ chronic L hemiparesis and spasticity, NWB x6 weeks (starting 12/15), seizure prec   Vitals   O2 Delivery Device None - Room Air   Cognition    Level of Consciousness Alert   Sleep/Wake Cycle   Sleep & Rest Awake   Functional Level of Assist   Tub / Shower Transfers Contact Guard Assist  (for dry tub/shower txfr w/ tub txfr bench (stand pivot))   Interdisciplinary Plan of Care Collaboration   Patient Position at End of Therapy Seated;Self Releasing Lap Belt Applied;Call Light within Reach     CGA for functional mobility w/ L platform walker, entire length of room + 5'    Assessment    Patient tolerated OT session well with focus on shower txfrs and functional mobility @ platform walker level. Demo's ability to complete try tub/shower txfr w/ CGA while adhering to NWB precautions without cues.   Strengths: Able to follow instructions, Alert and oriented, Effective communication skills, Independent prior level of function, Motivated for self care and independence, Pleasant and cooperative, Supportive family, Willingly participates in therapeutic activities  Barriers: Decreased endurance, Fatigue, Home accessibility, Impaired activity tolerance, Impaired balance, Pain,  Spasticity (NWB LLE)    Plan    ADLs w/ AE education prn, functional transfers, sit<>stands, balance/standing tolerance, endurance, family training.     Passport items to be completed:  Perform bathroom transfers, complete dressing, complete feeding, get ready for the day, prepare a simple meal, participate in household tasks, adapt home for safety needs, demonstrate home exercise program, complete caregiver training        Occupational Therapy Goals (Active)       Problem: Functional Transfers       Dates: Start: 12/21/22         Goal: STG-Within one week, patient will transfer to tub/shower       Dates: Start: 12/21/22       Description: (Simulate tub/shower set up) using AD/DME as needed.      Goal Note filed on 12/22/22 1151 by Tana Caceres OT       To be addressed.                  Problem: OT Long Term Goals       Dates: Start: 12/21/22         Goal: LTG-By discharge, patient will complete basic self care tasks at supv to mod I level using AE as needed.        Dates: Start: 12/21/22            Goal: LTG-By discharge, patient will perform bathroom transfers at supv to mod I level using AD/DME as needed.        Dates: Start: 12/21/22               Problem: Toileting       Dates: Start: 12/21/22         Goal: STG-Within one week, patient will complete toileting tasks with CGA-SBA using AE as needed.        Dates: Start: 12/21/22         Goal Note filed on 12/22/22 1151 by Tana Caceres, [email or web address]       Min-mod A

## 2022-12-22 NOTE — THERAPY
Physical Therapy   Daily Treatment     Patient Name: Cheng Foster  Age:  32 y.o., Sex:  male  Medical Record #: [id number]  Today's Date: 12/22/2022     Precautions  Precautions: Fall Risk, Non Weight Bearing Left Lower Extremity  Comments: hx CP w/ chronic L hemiparesis and spasticity, NWB x6 weeks (starting 12/15), seizure prec    Subjective    Patient is agreeable to participate, willing to take a lap around the gym before setting up with [employer]     Objective       12/22/22 0931   PT Charge Group   PT Gait Training (Units) 4   PT Total Time Spent   PT Individual Total Time Spent (Mins) 60   Gait Functional Level of Assist    Gait Level Of Assist Moderate Assist   Assistive Device Platform Walker   Distance (Feet)   (10, 15, 20, 30, 40 ft with seated rest breaks for 115 ft total)   Deviation Decreased Base Of Support  (hop to with platform walker. Compliant with NWB LLE)   Interdisciplinary Plan of Care Collaboration   IDT Collaboration with  Physical Therapist;Occupational Therapist   Patient Position at End of Therapy Seated;Self Releasing Lap Belt Applied  (seated in cafeteria playing [employer], states he hopes he gets a roommate so he has someone to talk to. Prefers to be around people)         Assessment    Patient requires wheelchair follow with gait, mod assist to strap his arm to platform. Mod assist with gait belt to maintain balance as he tips to R. Requires cues for hand placement before sitting down. Perseverative on getting set up with [employer].     Strengths: Able to follow instructions, Independent prior level of function, Motivated for self care and independence, Pleasant and cooperative, Supportive family, Willingly participates in therapeutic activities  Barriers: Impaired activity tolerance, Impaired balance, Pain, Spasticity, Generalized weakness    Plan    Gait with platform walker, standing tolerance, standing balance, transfer safety    Passport items to be  completed:  Get in/out of bed safely, in/out of a vehicle, safely use mobility device, walk or wheel around home/community, navigate up and down stairs, show how to get up/down from the ground, ensure home is accessible, demonstrate HEP, complete caregiver training    Physical Therapy Problems (Active)       Problem: Balance       Dates: Start: 12/21/22         Goal: STG-Within one week, patient will maintain static standing on carpet/ foam with platform walker x 2 minutes       Dates: Start: 12/21/22               Problem: Mobility       Dates: Start: 12/21/22         Goal: STG-Within one week, patient will ambulate 50 ft with platform walker and contact guard assist       Dates: Start: 12/21/22            Goal: STG-Within one week, patient will ambulate up/down a curb with platform walker and contact guard assist       Dates: Start: 12/21/22               Problem: Mobility Transfers       Dates: Start: 12/21/22         Goal: STG-Within one week, patient will transfer bed to chair with julio walker and supervision assist       Dates: Start: 12/21/22               Problem: PT-Long Term Goals       Dates: Start: 12/21/22         Goal: LTG-By discharge, patient will ambulate 75 ft with platform walker and supervision assist       Dates: Start: 12/21/22            Goal: LTG-By discharge, patient will perform home exercise program from handouts with minimal cuing       Dates: Start: 12/21/22            Goal: LTG-By discharge, patient will transfer in/out of a car with supervision assist and LRAD       Dates: Start: 12/21/22

## 2022-12-22 NOTE — CARE PLAN
Problem: Balance  Goal: STG-Within one week, patient will maintain static standing on carpet/ foam with platform walker x 2 minutes  Outcome: Not Met     Problem: Mobility  Goal: STG-Within one week, patient will ambulate 50 ft with platform walker and contact guard assist  Outcome: Not Met  Goal: STG-Within one week, patient will ambulate up/down a curb with platform walker and contact guard assist  Outcome: Not Met     Problem: Mobility Transfers  Goal: STG-Within one week, patient will transfer bed to chair with julio walker and supervision assist  Outcome: Not Met     Problem: PT-Long Term Goals  Goal: LTG-By discharge, patient will ambulate 75 ft with platform walker and supervision assist  Outcome: Not Met  Goal: LTG-By discharge, patient will perform home exercise program from handouts with minimal cuing  Outcome: Not Met  Goal: LTG-By discharge, patient will transfer in/out of a car with supervision assist and LRAD  Outcome: Not Met

## 2022-12-22 NOTE — DISCHARGE PLANNING
Case Management/IDT follow up.   IDT continues to recommend IRF level of care as patient continue to make progress with all therapies.   Projected dc date set for 12/30/22.      DC needs:  Recommendations made for outpatient PT/OT  Follow up with: PCP    Met with pt / family providing update from IDT and discussed plan of care.    Plan:  Continue to follow

## 2022-12-22 NOTE — CARE PLAN
Problem: Toileting  Goal: STG-Within one week, patient will complete toileting tasks with CGA-SBA using AE as needed.   Outcome: Progressing  Note: Min-mod A     Problem: Functional Transfers  Goal: STG-Within one week, patient will transfer to tub/shower  Description: (Simulate tub/shower set up) using AD/DME as needed.  Outcome: Progressing  Note: To be addressed.      Problem: Bathing  Goal: STG-Within one week, patient will bathe with min A using AE as needed.   Outcome: Met     Problem: Dressing  Goal: STG-Within one week, patient will dress LB with min A using AE as needed.   Outcome: Met

## 2022-12-22 NOTE — THERAPY
"Physical Therapy   Daily Treatment     Patient Name: Cheng Foster  Age:  32 y.o., Sex:  male  Medical Record #: [id number]  Today's Date: 12/21/2022     Precautions  Precautions: Fall Risk, Non Weight Bearing Left Lower Extremity  Comments: hx CP w/ chronic L hemiparesis and spasticity, NWB x6 weeks (starting 12/15), seizure prec, Covid+    Subjective    Patient is perseverating on Nintendo Switch, wants to make sure it can turn on and wants to find out which games he can play. Management informs me I cannot leave it in his room.     Objective       12/21/22 1531   PT Charge Group   PT Gait Training (Units) 2   PT Total Time Spent   PT Individual Total Time Spent (Mins) 30   Gait Functional Level of Assist    Gait Level Of Assist Moderate Assist   Assistive Device Platform Walker   Distance (Feet) 4   # of Times Distance was Traveled 1   Deviation Antalgic;Decreased Base Of Support;Bradykinetic   Interdisciplinary Plan of Care Collaboration   IDT Collaboration with  Occupational Therapist   Patient Position at End of Therapy Seated;Call Light within Reach;Tray Table within Reach;Phone within Reach   Collaboration Comments nintendo switch         Assessment    Patient perseverating on Nintendo Switch limits amount of time spent on gait training. His anxiety about any potential pain also limits his ability to ambulate farther. He will benefit from coordinated medication schedule with nursing to ensure pain meds on board in preparation for therapy session. He will also benefit from structured therapy session, for example \"you can play with the Switch for the last 10-15 minutes of our session if we complete X by that time\".     Strengths: Able to follow instructions, Independent prior level of function, Motivated for self care and independence, Pleasant and cooperative, Supportive family, Willingly participates in therapeutic activities  Barriers: Impaired activity tolerance, Impaired balance, Pain, Spasticity, " Generalized weakness    Plan    Gait with platform walker, standing tolerance, standing balance, transfer safety    Passport items to be completed:  Get in/out of bed safely, in/out of a vehicle, safely use mobility device, walk or wheel around home/community, navigate up and down stairs, show how to get up/down from the ground, ensure home is accessible, demonstrate HEP, complete caregiver training    Physical Therapy Problems (Active)       Problem: Balance       Dates: Start: 12/21/22         Goal: STG-Within one week, patient will maintain static standing on carpet/ foam with platform walker x 2 minutes       Dates: Start: 12/21/22               Problem: Mobility       Dates: Start: 12/21/22         Goal: STG-Within one week, patient will ambulate 50 ft with platform walker and contact guard assist       Dates: Start: 12/21/22            Goal: STG-Within one week, patient will ambulate up/down a curb with platform walker and contact guard assist       Dates: Start: 12/21/22               Problem: Mobility Transfers       Dates: Start: 12/21/22         Goal: STG-Within one week, patient will transfer bed to chair with julio walker and supervision assist       Dates: Start: 12/21/22               Problem: PT-Long Term Goals       Dates: Start: 12/21/22         Goal: LTG-By discharge, patient will ambulate 75 ft with platform walker and supervision assist       Dates: Start: 12/21/22            Goal: LTG-By discharge, patient will perform home exercise program from handouts with minimal cuing       Dates: Start: 12/21/22            Goal: LTG-By discharge, patient will transfer in/out of a car with supervision assist and LRAD       Dates: Start: 12/21/22

## 2022-12-22 NOTE — CARE PLAN
The patient is Watcher - Medium risk of patient condition declining or worsening         Progress made toward(s) clinical / shift goals:    Problem: Pain - Standard  Goal: Alleviation of pain or a reduction in pain to the patient’s comfort goal  Outcome: Progressing  Note: Patient able to verbalize needs.  Denies pain or discomfort this shift and no s/s same noted.  Will continue to monitor.      Problem: Psychosocial  Goal: Patient's level of anxiety will decrease  Outcome: Progressing     Problem: Seizure Precautions  Goal: Implementation of seizure precautions  Outcome: Progressing       Alert and oriented X 4, on room air, no SOB. OOB-BR in W/C, unsteady, NWB on LE, mod-assist. Safety, fall precautions maintained. Will continue to monitor.

## 2022-12-22 NOTE — THERAPY
Occupational Therapy  Daily Treatment     Patient Name: Cheng Foster  Age:  32 y.o., Sex:  male  Medical Record #: [id number]  Today's Date: 12/22/2022     Precautions  Precautions: (P) Fall Risk, Non Weight Bearing Left Lower Extremity  Comments: (P) hx CP w/ chronic L hemiparesis and spasticity, NWB x6 weeks (starting 12/15), seizure prec         Subjective    Pt agreeable to OT session. Per RN, pt re-tested for Covid and is now off of isolation prec.      Objective       12/22/22 0831   OT Charge Group   OT Self Care / ADL (Units) 4   OT Total Time Spent   OT Individual Total Time Spent (Mins) 60   Precautions   Precautions Fall Risk;Non Weight Bearing Left Lower Extremity   Comments hx CP w/ chronic L hemiparesis and spasticity, NWB x6 weeks (starting 12/15), seizure prec   Cognition    Level of Consciousness Alert   Functional Level of Assist   Grooming Supervision;Seated   Grooming Description Increased time;Supervision for safety;Seated in wheelchair at sink   Bathing Minimal Assist   Bathing Description Grab bar;Hand held shower;Tub bench;Increased time;Set-up of equipment;Supervision for safety  (assist to wash LLE and L foot)   Upper Body Dressing Supervision   Upper Body Dressing Description Set-up of equipment   Lower Body Dressing Minimal Assist   Lower Body Dressing Description Reacher;Sock aid;Increased time;Set-up of equipment;Supervision for safety  (able to thread LEs into underwear and pants using reacher, assist to pull up pants in back; able to don R sock using sock aid w/ set up; unable to use sock aid for L foot 2/2 pain and spasticity)   Toileting Moderate Assist   Toileting Description Assist for hygiene;Assist for standing balance;Grab bar;Increased time;Adaptive equipment  (commode over toilet; assist w/ thoroughness w/ hygiene after BM)   Toilet Transfers Contact Guard Assist   Toilet Transfer Description Adaptive equipment;Grab bar;Increased time;Supervision for safety;Verbal  cueing  (w/c<>commode over toilet SPT via GB)   Tub / Shower Transfers Contact Guard Assist   Tub Shower Transfer Description Adaptive equipment;Grab bar;Shower bench;Increased time;Set-up of equipment;Supervision for safety;Verbal cueing  (w/c<>shower bench SPT via GB)   Interdisciplinary Plan of Care Collaboration   IDT Collaboration with  Nursing;Physician;Physical Therapist   Patient Position at End of Therapy Seated;Self Releasing Lap Belt Applied;Other (Comments)   Collaboration Comments RN/physician re: skin rash, BM, pt no longer on isolation prec; transfer of care to PT       Pt able to propel w/c from room>therapy gym with supv.    Pt took pictures of recommended bathroom equipment and sent them to his mother, including commode for over toilet and tub transfer bench.    Assessment    Pt tolerated OT session well and progressing with ADLs and bathroom txfrs. He demo good carryover with use of reacher to don underwear and pants. Pt had difficulty managing sock aid 2/2 pain and L sided spasticity. He will need a tub txfr bench and commode over toilet for safety with ADLs and bathroom txfrs in his home environment.     Strengths: Able to follow instructions, Alert and oriented, Effective communication skills, Independent prior level of function, Motivated for self care and independence, Pleasant and cooperative, Supportive family, Willingly participates in therapeutic activities  Barriers: Decreased endurance, Fatigue, Home accessibility, Impaired activity tolerance, Impaired balance, Pain, Spasticity (NWB LLE)    Plan    ADLs w/ AE education prn, functional transfers, sit<>stands, balance/standing tolerance, endurance, family training.     Passport items to be completed:  Perform bathroom transfers, complete dressing, complete feeding, get ready for the day, prepare a simple meal, participate in household tasks, adapt home for safety needs, demonstrate home exercise program, complete caregiver training      Occupational Therapy Goals (Active)       Problem: Bathing       Dates: Start: 12/21/22         Goal: STG-Within one week, patient will bathe with min A using AE as needed.        Dates: Start: 12/21/22               Problem: Dressing       Dates: Start: 12/21/22         Goal: STG-Within one week, patient will dress LB with min A using AE as needed.        Dates: Start: 12/21/22               Problem: Functional Transfers       Dates: Start: 12/21/22         Goal: STG-Within one week, patient will transfer to tub/shower       Dates: Start: 12/21/22       Description: (Simulate tub/shower set up) using AD/DME as needed.            Problem: OT Long Term Goals       Dates: Start: 12/21/22         Goal: LTG-By discharge, patient will complete basic self care tasks at supv to mod I level using AE as needed.        Dates: Start: 12/21/22            Goal: LTG-By discharge, patient will perform bathroom transfers at supv to mod I level using AD/DME as needed.        Dates: Start: 12/21/22               Problem: Toileting       Dates: Start: 12/21/22         Goal: STG-Within one week, patient will complete toileting tasks with CGA-SBA using AE as needed.        Dates: Start: 12/21/22

## 2022-12-22 NOTE — CARE PLAN
"  Problem: Knowledge Deficit - Standard  Goal: Patient and family/care givers will demonstrate understanding of plan of care, disease process/condition, diagnostic tests and medications  Outcome: Progressing     Problem: Fall Risk - Rehab  Goal: Patient will remain free from falls  Outcome: Progressing  Note: Nadiya Moss Fall risk Assessment Score: 11    Moderate fall risk Interventions  - Bed and strip alarm   - Yellow sign by the door   - Yellow wrist band \"Fall risk\"  - Do not leave patient unattended in the bathroom  - Fall risk education provided     "

## 2022-12-22 NOTE — PROGRESS NOTES
"Rehab Progress Note     Encounter Date: 12/22/2022    CC: s/p L hip fracture     Interval Events (Subjective)  Vitals reviewed: WNL.  Labs reviewed: Elevated LFT improved.  Repeat is negative.  Mild hyponatremia with sodium 134  Patient seen and examined while working with OT.  Patient preparing to take a shower.  Able to visualize skin of left lower extremity.  Patient has a mild rash.  Patient reports has not showered in a week.  Patient denies that the rash is itchy or irritating.  Patient reports he thinks this will resolve after the shower.  Patient's COVID test was redone, and is negative.  Patient has been from his isolation    Objective:  Physical Exam:  Vitals: /76   Pulse 82   Temp 36.8 °C (98.3 °F) (Oral)   Resp 20   Ht 1.93 m (6' 4\")   Wt 72.6 kg (160 lb 0.9 oz)   SpO2 95%   Gen: NAD, seated comfortably on shower chair  Head:  NC/AT  Eyes/ Nose/ Mouth: PERRLA, moist mucous membranes  Cardio: RRR, good distal perfusion, warm extremities  Pulm: normal respiratory effort, no cyanosis   Abd: Soft NTND, negative borborygmi   Ext: No peripheral edema. No calf tenderness. No clubbing.  Neuro: Left upper extremity flexion contracture, left lower extremity plantar flexion contracture.  Skin: small erythematous petechia like spots, nontender    Mental status:  A&Ox4 (person, place, date, situation) answers questions appropriately follows commands  Speech: fluent, no aphasia or dysarthria      Recent Results (from the past 72 hour(s))   CBC with Differential    Collection Time: 12/21/22  6:33 AM   Result Value Ref Range    WBC 6.4 4.8 - 10.8 K/uL    RBC 4.61 (L) 4.70 - 6.10 M/uL    Hemoglobin 14.5 14.0 - 18.0 g/dL    Hematocrit 43.6 42.0 - 52.0 %    MCV 94.6 81.4 - 97.8 fL    MCH 31.5 27.0 - 33.0 pg    MCHC 33.3 (L) 33.7 - 35.3 g/dL    RDW 41.4 35.9 - 50.0 fL    Platelet Count 309 164 - 446 K/uL    MPV 9.7 9.0 - 12.9 fL    Neutrophils-Polys 58.30 44.00 - 72.00 %    Lymphocytes 27.20 22.00 - 41.00 %    " Monocytes 8.40 0.00 - 13.40 %    Eosinophils 4.70 0.00 - 6.90 %    Basophils 0.60 0.00 - 1.80 %    Immature Granulocytes 0.80 0.00 - 0.90 %    Nucleated RBC 0.00 /100 WBC    Neutrophils (Absolute) 3.75 1.82 - 7.42 K/uL    Lymphs (Absolute) 1.75 1.00 - 4.80 K/uL    Monos (Absolute) 0.54 0.00 - 0.85 K/uL    Eos (Absolute) 0.30 0.00 - 0.51 K/uL    Baso (Absolute) 0.04 0.00 - 0.12 K/uL    Immature Granulocytes (abs) 0.05 0.00 - 0.11 K/uL    NRBC (Absolute) 0.00 K/uL   Comp Metabolic Panel (CMP)    Collection Time: 12/21/22  6:33 AM   Result Value Ref Range    Sodium 136 135 - 145 mmol/L    Potassium 4.0 3.6 - 5.5 mmol/L    Chloride 100 96 - 112 mmol/L    Co2 29 20 - 33 mmol/L    Anion Gap 7.0 7.0 - 16.0    Glucose 96 65 - 99 mg/dL    Bun 14 8 - 22 mg/dL    Creatinine 0.84 0.50 - 1.40 mg/dL    Calcium 9.3 8.5 - 10.5 mg/dL    AST(SGOT) 114 (H) 12 - 45 U/L    ALT(SGPT) 151 (H) 2 - 50 U/L    Alkaline Phosphatase 110 (H) 30 - 99 U/L    Total Bilirubin 0.6 0.1 - 1.5 mg/dL    Albumin 4.0 3.2 - 4.9 g/dL    Total Protein 7.2 6.0 - 8.2 g/dL    Globulin 3.2 1.9 - 3.5 g/dL    A-G Ratio 1.3 g/dL   HEMOGLOBIN A1C    Collection Time: 12/21/22  6:33 AM   Result Value Ref Range    Glycohemoglobin 5.8 (H) 4.0 - 5.6 %    Est Avg Glucose 120 mg/dL   Vitamin D, 25-hydroxy (blood)    Collection Time: 12/21/22  6:33 AM   Result Value Ref Range    25-Hydroxy   Vitamin D 25 25 (L) 30 - 100 ng/mL   CORRECTED CALCIUM    Collection Time: 12/21/22  6:33 AM   Result Value Ref Range    Correct Calcium 9.3 8.5 - 10.5 mg/dL   ESTIMATED GFR    Collection Time: 12/21/22  6:33 AM   Result Value Ref Range    GFR (CKD-EPI) 120 >60 mL/min/1.73 m 2   COV-2, FLU A/B, AND RSV BY PCR (2-4 HOURS CEPHEID): Collect NP swab in VTM    Collection Time: 12/21/22 12:00 PM    Specimen: Respirate   Result Value Ref Range    Influenza virus A RNA Negative Negative    Influenza virus B, PCR Negative Negative    RSV, PCR Negative Negative    SARS-CoV-2 by PCR NotDetected      SARS-CoV-2 Source NP Swab    Comp Metabolic Panel    Collection Time: 12/22/22  6:23 AM   Result Value Ref Range    Sodium 134 (L) 135 - 145 mmol/L    Potassium 4.4 3.6 - 5.5 mmol/L    Chloride 101 96 - 112 mmol/L    Co2 23 20 - 33 mmol/L    Anion Gap 10.0 7.0 - 16.0    Glucose 98 65 - 99 mg/dL    Bun 15 8 - 22 mg/dL    Creatinine 0.60 0.50 - 1.40 mg/dL    Calcium 9.0 8.5 - 10.5 mg/dL    AST(SGOT) 87 (H) 12 - 45 U/L    ALT(SGPT) 156 (H) 2 - 50 U/L    Alkaline Phosphatase 109 (H) 30 - 99 U/L    Total Bilirubin 0.6 0.1 - 1.5 mg/dL    Albumin 3.7 3.2 - 4.9 g/dL    Total Protein 7.0 6.0 - 8.2 g/dL    Globulin 3.3 1.9 - 3.5 g/dL    A-G Ratio 1.1 g/dL   CORRECTED CALCIUM    Collection Time: 12/22/22  6:23 AM   Result Value Ref Range    Correct Calcium 9.2 8.5 - 10.5 mg/dL   ESTIMATED GFR    Collection Time: 12/22/22  6:23 AM   Result Value Ref Range    GFR (CKD-EPI) 133 >60 mL/min/1.73 m 2       Current Facility-Administered Medications   Medication Frequency    acetaminophen (TYLENOL) tablet 500 mg Q6HRS PRN    vitamin D3 (cholecalciferol) tablet 1,000 Units DAILY    Respiratory Therapy Consult Continuous RT    Pharmacy Consult Request ...Pain Management Review 1 Each PHARMACY TO DOSE    senna-docusate (PERICOLACE or SENOKOT S) 8.6-50 MG per tablet 2 Tablet BID    And    polyethylene glycol/lytes (MIRALAX) PACKET 1 Packet QDAY PRN    And    magnesium hydroxide (MILK OF MAGNESIA) suspension 30 mL QDAY PRN    And    bisacodyl (DULCOLAX) suppository 10 mg QDAY PRN    omeprazole (PRILOSEC) capsule 20 mg DAILY    ondansetron (ZOFRAN ODT) dispertab 4 mg 4X/DAY PRN    Or    ondansetron (ZOFRAN) syringe/vial injection 4 mg 4X/DAY PRN    traZODone (DESYREL) tablet 50 mg QHS PRN    oxyCODONE immediate-release (ROXICODONE) tablet 2.5 mg Q3HRS PRN    Or    oxyCODONE immediate-release (ROXICODONE) tablet 5 mg Q3HRS PRN    enoxaparin (Lovenox) inj 40 mg QHS    cyclobenzaprine (Flexeril) tablet 10 mg TID    benztropine (COGENTIN)  tablet 2 mg QHS    ARIPiprazole (Abilify) tablet 10 mg DAILY    citalopram (CeleXA) tablet 40 mg DAILY    OXcarbazepine (TRILEPTAL) tablet 900 mg DAILY    levETIRAcetam (Keppra) injection 500 mg QDAY PRN    gabapentin (NEURONTIN) capsule 300 mg TID       Orders Placed This Encounter   Procedures    Diet Order Diet: Regular     Standing Status:   Standing     Number of Occurrences:   1     Order Specific Question:   Diet:     Answer:   Regular [1]       _____________________________________  Interdisciplinary Team Conference   Most recent IDT on 12/22/2022    I, Kely Villareal D.O./Ph.D., was present and led the interdisciplinary team conference on 12/22/2022.  I led the IDT conference and agree with the IDT conference documentation and plan of care as noted below.     Nursing:  Diet Current Diet Order   Procedures    Diet Order Diet: Regular       Eating ADL Supervision  Set-up of equipment or meal/tube feeding   % of Last Meal  Oral Nutrition: Lunch, Between % Consumed   Sleep    Bowel Last BM: 12/21/22   Bladder Continent    Barriers to Discharge Home: pain control       Physical Therapy:  Bed Mobility    Transfers Contact Guard Assist  Adaptive equipment, Increased time, Squat pivot transfer to wheelchair, Supervision for safety, Verbal cueing, Set-up of equipment   Mobility Moderate Assist   Stairs    Barriers to Discharge Home: poor endurance, family training, care transfers, platform walker       Occupational Therapy:  Grooming Standby Assist, Seated   Bathing Moderate Assist   UB Dressing Minimal Assist   LB Dressing Maximal Assist   Toileting Maximal Assist   Shower & Transfer    Barriers to Discharge Home: family training, equipment set up.       Respiratory Therapy:  O2 (LPM): 0  O2 Delivery Device: None - Room Air    Case Management:  Continues to work on disposition and DME needs.      Discharge Date/Disposition:  12/30/22 home with family with outpatient clinic    _____________________________________      Assessment:  Active Hospital Problems    Diagnosis     *S/p left hip fracture     History of seizures     COVID-19     Muscle spasm of left lower extremity     Cerebral palsy, hemiplegic (HCC)        Medical Decision Making and Plan:  Left hip fracture  - Sustained during ground-level fall with a slip on ice  - Status post ORIF percutaneous pinning by Dr. Becker on 12/15/2022  - NWB LLE x6 weeks  - Monitor left lower extremity rash, patient not chart in a week. monitor to see if shower assist with improving rash.  - Initiate comprehensive IRF level therapy with 3 hours of therapy 5 days a week with services from PT/OT     History of seizures  - Continuing patient's home dose Trileptal 900 mg daily  - Patient is allergic to Versed, has anaphylaxis response to benzodiazepines, including cardiac arrest  - mom reportsif patient has a seizure they hold the patient until it subsides, they do not last longer than 5 minutes  - mom reports history of focal type seizures including staring off and recovering shortly  - I have discussed patient's allergy to benzodiazepines with pharmacy, we will plan for IV Keppra for seizures that are uncontrollable     COVID-19  - Incidental COVID-19 finding at time of discharge in anticipation for transferring to rehab  - Patient will need to be in isolation x10 days, would be able to be out of isolation on 12/30  - 12/22 COVID retest is negative, removed from isolation     Cerebral palsy, with spasticity  - Was previously on Flexeril, is tolerating well     History of depression  - Continue patient's home dose Abilify, Celexa  - May benefit from rehab psychology     Transaminitis  -Elevated LFTs on admission labs  - changed to scheduled Tylenol to Tylenol 500 mg as needed  - LFTs mildly improving as of 12/22    Acute blood loss anemia  - Hemoglobin 12.4 on 12/19, down from 14.7  - 12/21 hemoglobin 14.5     Hyponatremia  - Sodium 132  postoperatively  - 12/22 sodium 134    Pain  - 12/21 started gabapentin 300 3 times daily for pain and to lower seizure threshold  - Tylenol as needed, oxycodone as needed     Bowel  - Constipation prevention, scheduled senna   - As needed stool softeners     GI prophylaxis: Omeprazole     DVT prophylaxis: Lovenox    Total time:  44  minutes.  I spent greater than 50% of the time for patient care and coordination on this date, including unit/floor time, leading team conference, and face-to-face time with the patient as per assessment and plan above including.    Kely Villareal D.O.

## 2022-12-23 PROCEDURE — 97116 GAIT TRAINING THERAPY: CPT

## 2022-12-23 PROCEDURE — 700111 HCHG RX REV CODE 636 W/ 250 OVERRIDE (IP): Performed by: PHYSICAL MEDICINE & REHABILITATION

## 2022-12-23 PROCEDURE — 97535 SELF CARE MNGMENT TRAINING: CPT

## 2022-12-23 PROCEDURE — 770010 HCHG ROOM/CARE - REHAB SEMI PRIVAT*

## 2022-12-23 PROCEDURE — 97530 THERAPEUTIC ACTIVITIES: CPT

## 2022-12-23 PROCEDURE — 97110 THERAPEUTIC EXERCISES: CPT

## 2022-12-23 PROCEDURE — A9270 NON-COVERED ITEM OR SERVICE: HCPCS | Performed by: PHYSICAL MEDICINE & REHABILITATION

## 2022-12-23 PROCEDURE — 700102 HCHG RX REV CODE 250 W/ 637 OVERRIDE(OP): Performed by: PHYSICAL MEDICINE & REHABILITATION

## 2022-12-23 PROCEDURE — 99231 SBSQ HOSP IP/OBS SF/LOW 25: CPT | Performed by: PHYSICAL MEDICINE & REHABILITATION

## 2022-12-23 RX ADMIN — OXYCODONE 5 MG: 5 TABLET ORAL at 23:49

## 2022-12-23 RX ADMIN — ACETAMINOPHEN 500 MG: 500 TABLET ORAL at 08:25

## 2022-12-23 RX ADMIN — ENOXAPARIN SODIUM 40 MG: 40 INJECTION SUBCUTANEOUS at 20:57

## 2022-12-23 RX ADMIN — TRAZODONE HYDROCHLORIDE 50 MG: 50 TABLET ORAL at 20:56

## 2022-12-23 RX ADMIN — SENNOSIDES AND DOCUSATE SODIUM 2 TABLET: 50; 8.6 TABLET ORAL at 08:24

## 2022-12-23 RX ADMIN — CYCLOBENZAPRINE 10 MG: 10 TABLET, FILM COATED ORAL at 15:08

## 2022-12-23 RX ADMIN — CITALOPRAM HYDROBROMIDE 40 MG: 20 TABLET ORAL at 08:25

## 2022-12-23 RX ADMIN — GABAPENTIN 300 MG: 300 CAPSULE ORAL at 08:25

## 2022-12-23 RX ADMIN — OMEPRAZOLE 20 MG: 20 CAPSULE, DELAYED RELEASE ORAL at 08:25

## 2022-12-23 RX ADMIN — OXCARBAZEPINE 900 MG: 300 TABLET, FILM COATED ORAL at 08:24

## 2022-12-23 RX ADMIN — GABAPENTIN 300 MG: 300 CAPSULE ORAL at 15:08

## 2022-12-23 RX ADMIN — BENZTROPINE MESYLATE 2 MG: 1 TABLET ORAL at 20:56

## 2022-12-23 RX ADMIN — Medication 1000 UNITS: at 08:25

## 2022-12-23 RX ADMIN — ARIPIPRAZOLE 10 MG: 10 TABLET ORAL at 08:25

## 2022-12-23 RX ADMIN — CYCLOBENZAPRINE 10 MG: 10 TABLET, FILM COATED ORAL at 08:25

## 2022-12-23 RX ADMIN — OXYCODONE 2.5 MG: 5 TABLET ORAL at 10:43

## 2022-12-23 RX ADMIN — CYCLOBENZAPRINE 10 MG: 10 TABLET, FILM COATED ORAL at 20:56

## 2022-12-23 RX ADMIN — GABAPENTIN 300 MG: 300 CAPSULE ORAL at 20:56

## 2022-12-23 RX ADMIN — SENNOSIDES AND DOCUSATE SODIUM 2 TABLET: 50; 8.6 TABLET ORAL at 20:56

## 2022-12-23 ASSESSMENT — GAIT ASSESSMENTS
ASSISTIVE DEVICE: PLATFORM WALKER
GAIT LEVEL OF ASSIST: MODERATE ASSIST
DEVIATION: DECREASED BASE OF SUPPORT
DISTANCE (FEET): 20
DEVIATION: DECREASED BASE OF SUPPORT
GAIT LEVEL OF ASSIST: MODERATE ASSIST
ASSISTIVE DEVICE: PLATFORM WALKER
DISTANCE (FEET): 20

## 2022-12-23 ASSESSMENT — PAIN DESCRIPTION - PAIN TYPE
TYPE: ACUTE PAIN;SURGICAL PAIN
TYPE: ACUTE PAIN;SURGICAL PAIN

## 2022-12-23 NOTE — THERAPY
"Occupational Therapy  Daily Treatment     Patient Name: Cheng Foster  Age:  32 y.o., Sex:  male  Medical Record #: [id number]  Today's Date: 12/23/2022     Precautions  Precautions: Fall Risk, Non Weight Bearing Left Lower Extremity  Comments: hx CP w/ chronic L hemiparesis and spasticity, NWB x6 weeks (starting 12/15), seizure prec         Subjective    \"Pull my pants down\"  \"It is time for you to wipe my butt\"  Required edu on importance of trying to do more of the tasks himself while OT is there to keep him safe.      \"My brother decided that we won't build a ramp and he will jsut carry me in to the house.\" Emphasized that this would not be safe and that family needs to come in for training before d/c home.     Objective       12/23/22 0831   OT Charge Group   OT Self Care / ADL (Units) 3   OT Therapy Activity (Units) 1   OT Total Time Spent   OT Individual Total Time Spent (Mins) 60   Functional Level of Assist   Upper Body Dressing Supervision   Lower Body Dressing Contact Guard Assist  (verbal encouragement and cues w/Reacher)   Toileting Minimal Assist  (Cued to attempt toileting tasks himself first and then OT would assist if needed. Pt performed tasks himself, but provided Min A for balance during hygiene.)   Bed, Chair, Wheelchair Transfer Contact Guard Assist  (declined recommendation to use PFWW)   Toilet Transfers Contact Guard Assist  (gb and commode over toilet.)   Balance   Comments Stood for 4 minutes x 2 during battleship.   Interdisciplinary Plan of Care Collaboration   IDT Collaboration with  Physical Therapist   Patient Position at End of Therapy Seated;Self Releasing Lap Belt Applied  (in gym)   Collaboration Comments Voalted PT pt's position for hand off of care. PT reviewed CLOF with OT         Assessment    Pt with improvements to ADL independence and standing tolerance. Pt with bowel urgency during session, but able to make it in time. RN made aware.    Strengths: Able to follow " instructions, Alert and oriented, Effective communication skills, Independent prior level of function, Motivated for self care and independence, Pleasant and cooperative, Supportive family, Willingly participates in therapeutic activities  Barriers: Decreased endurance, Fatigue, Home accessibility, Impaired activity tolerance, Impaired balance, Pain, Spasticity (NWB LLE)    Plan       ADLs w/ AE education prn, functional transfers, sit<>stands, balance/standing tolerance, endurance, family training.     Passport items to be completed:  Perform bathroom transfers, complete dressing, complete feeding, get ready for the day, prepare a simple meal, participate in household tasks, adapt home for safety needs, demonstrate home exercise program, complete caregiver training     Occupational Therapy Goals (Active)       Problem: Functional Transfers       Dates: Start: 12/21/22         Goal: STG-Within one week, patient will transfer to tub/shower       Dates: Start: 12/21/22       Description: (Simulate tub/shower set up) using AD/DME as needed.      Goal Note filed on 12/22/22 1151 by Tana Caceres OT       To be addressed.                  Problem: OT Long Term Goals       Dates: Start: 12/21/22         Goal: LTG-By discharge, patient will complete basic self care tasks at supv to mod I level using AE as needed.        Dates: Start: 12/21/22            Goal: LTG-By discharge, patient will perform bathroom transfers at supv to mod I level using AD/DME as needed.        Dates: Start: 12/21/22               Problem: Toileting       Dates: Start: 12/21/22         Goal: STG-Within one week, patient will complete toileting tasks with CGA-SBA using AE as needed.        Dates: Start: 12/21/22         Goal Note filed on 12/22/22 1151 by Tana Caceres, OT       Min-mod A

## 2022-12-23 NOTE — THERAPY
Physical Therapy   Daily Treatment     Patient Name: Cheng Foster  Age:  32 y.o., Sex:  male  Medical Record #: [id number]  Today's Date: 12/23/2022     Precautions  Precautions: Fall Risk, Non Weight Bearing Left Lower Extremity  Comments: hx CP w/ chronic L hemiparesis and spasticity, NWB x6 weeks (starting 12/15), seizure prec    Subjective    Patient is agreeable to participate, states he is bummed about the iPointer switch, that he only has [employer] games downloaded. Would like to play the Wii     Objective       12/23/22 0931 12/23/22 1301   PT Charge Group   PT Gait Training (Units) 4 2   PT Total Time Spent   PT Individual Total Time Spent (Mins) 60 30   Pain 0 - 10 Group   Location Leg;Knee  --    Location Orientation Left  --    Pain Rating Scale (NPRS) 7  --    Description Aching  --    Gait Functional Level of Assist    Gait Level Of Assist Moderate Assist Moderate Assist   Assistive Device Platform Walker Platform Walker   Distance (Feet) 20 20   # of Times Distance was Traveled 2 3  (seated rest breaks between bouts, RLE gets fatigued and shakes.)   Deviation Decreased Base Of Support  (RLE tremors due to fatigue s/p standing with OT.) Decreased Base Of Support   Sitting Lower Body Exercises   Sit to Stand 1 set of 10  --    Standing Lower Body Exercises   Heel Rise 1 set of 10;Right  --    Interdisciplinary Plan of Care Collaboration   IDT Collaboration with   --  Nursing   Patient Position at End of Therapy  --  Seated;Call Light within Reach;Tray Table within Reach;Phone within Reach   Collaboration Comments  --  spoke with charge RN re: patient wants a roommate because he's lonely         Assessment    Patient is able to consistently tolerate distances of approx 20 ft when ambulating, although he requires mod assist to strap/release his arm to the platform. Requires wheelchair follow. Patient's brother is not going to be building a ramp, states he needs more time. Patient reports family cannot come  for any kind of training due to work schedules until he gets released.     Strengths: Able to follow instructions, Independent prior level of function, Motivated for self care and independence, Pleasant and cooperative, Supportive family, Willingly participates in therapeutic activities  Barriers: Impaired activity tolerance, Impaired balance, Pain, Spasticity, Generalized weakness    Plan    Gait with platform walker, standing tolerance, standing balance, transfer safety    Passport items to be completed:  Get in/out of bed safely, in/out of a vehicle, safely use mobility device, walk or wheel around home/community, navigate up and down stairs, show how to get up/down from the ground, ensure home is accessible, demonstrate HEP, complete caregiver training    Physical Therapy Problems (Active)       Problem: Balance       Dates: Start: 12/21/22         Goal: STG-Within one week, patient will maintain static standing on carpet/ foam with platform walker x 2 minutes       Dates: Start: 12/21/22               Problem: Mobility       Dates: Start: 12/21/22         Goal: STG-Within one week, patient will ambulate 50 ft with platform walker and contact guard assist       Dates: Start: 12/21/22            Goal: STG-Within one week, patient will ambulate up/down a curb with platform walker and contact guard assist       Dates: Start: 12/21/22               Problem: Mobility Transfers       Dates: Start: 12/21/22         Goal: STG-Within one week, patient will transfer bed to chair with julio walker and supervision assist       Dates: Start: 12/21/22               Problem: PT-Long Term Goals       Dates: Start: 12/21/22         Goal: LTG-By discharge, patient will ambulate 75 ft with platform walker and supervision assist       Dates: Start: 12/21/22            Goal: LTG-By discharge, patient will perform home exercise program from handouts with minimal cuing       Dates: Start: 12/21/22            Goal: LTG-By discharge,  patient will transfer in/out of a car with supervision assist and LRAD       Dates: Start: 12/21/22

## 2022-12-23 NOTE — THERAPY
Occupational Therapy  Daily Treatment     Patient Name: Cheng Foster  Age:  32 y.o., Sex:  male  Medical Record #: [id number]  Today's Date: 12/23/2022     Precautions  Precautions: Fall Risk, Non Weight Bearing Left Lower Extremity  Comments: hx CP w/ chronic L hemiparesis and spasticity, NWB x6 weeks (starting 12/15), seizure prec    Subjective    Received patient seated in w/c, agreeable to participate in OT. C/o L knee pain at start of session, improved w/ icing.      Objective     12/23/22 1101   OT Charge Group   OT Therapy Activity (Units) 1   OT Therapeutic Exercise (Units) 1   OT Total Time Spent   OT Individual Total Time Spent (Mins) 30   Precautions   Precautions Fall Risk;Non Weight Bearing Left Lower Extremity   Comments hx CP w/ chronic L hemiparesis and spasticity, NWB x6 weeks (starting 12/15), seizure prec   Vitals   O2 Delivery Device None - Room Air   Cognition    Level of Consciousness Alert   Sleep/Wake Cycle   Sleep & Rest Awake   Sitting Upper Body Exercises   Chest Press 2 sets of 10;Right ;Weight (See Comments for lbs)   Side Arm Raise 2 sets of 10;Right ;Weight (See Comments for lbs)   Shoulder Press 2 sets of 10;Right ;Weight (See Comments for lbs)   Bicep Curls 2 sets of 10;Right ;Weight (See Comments for lbs)   Pronation / Supination 2 sets of 10;Right ;Weight (See Comments for lbs)   Comments Patient completed all UE therex while seated in w/c (w/ 5# dumbbell)   Interdisciplinary Plan of Care Collaboration   Patient Position at End of Therapy Seated;Self Releasing Lap Belt Applied  (in dining room, RN aware)     Elevated leg rest provided for LLE per patient's request    Min A for functional mobility w/ L platform walker x 30'    Ice pack ace wrapped around patient's L knee w/ good results     Assessment    Patient tolerated OT session fair with focus on UB strengthening, pain management and functional mobility (L platform walker level). Completed UE therex to the best of his  abilities (RUE only), w/ no c/o pain or discomfort. Declined to participate in standing activity @ start of session due to L knee pain however tolerated functional mobility @ platform walker level x 30' at end of session (min A level). Reported improvement in L knee pain following icing.   Strengths: Able to follow instructions, Alert and oriented, Effective communication skills, Independent prior level of function, Motivated for self care and independence, Pleasant and cooperative, Supportive family, Willingly participates in therapeutic activities  Barriers: Decreased endurance, Fatigue, Home accessibility, Impaired activity tolerance, Impaired balance, Pain, Spasticity (NWB LLE)    Plan    ADLs w/ AE education prn, functional transfers, sit<>stands, balance/standing tolerance, endurance, family training.     Passport items to be completed:  Perform bathroom transfers, complete dressing, complete feeding, get ready for the day, prepare a simple meal, participate in household tasks, adapt home for safety needs, demonstrate home exercise program, complete caregiver training        Occupational Therapy Goals (Active)       Problem: Functional Transfers       Dates: Start: 12/21/22         Goal: STG-Within one week, patient will transfer to tub/shower       Dates: Start: 12/21/22       Description: (Simulate tub/shower set up) using AD/DME as needed.      Goal Note filed on 12/22/22 1151 by Tana Caceres OT       To be addressed.                  Problem: OT Long Term Goals       Dates: Start: 12/21/22         Goal: LTG-By discharge, patient will complete basic self care tasks at supv to mod I level using AE as needed.        Dates: Start: 12/21/22            Goal: LTG-By discharge, patient will perform bathroom transfers at supv to mod I level using AD/DME as needed.        Dates: Start: 12/21/22               Problem: Toileting       Dates: Start: 12/21/22         Goal: STG-Within one week, patient will complete  toileting tasks with CGA-SBA using AE as needed.        Dates: Start: 12/21/22         Goal Note filed on 12/22/22 1151 by Tana Caceres OT       Min-mod A

## 2022-12-23 NOTE — PROGRESS NOTES
"Rehab Progress Note     Encounter Date: 12/23/2022    CC: s/p L hip fracture     Interval Events (Subjective)  Vitals reviewed: WNL.  No new labs to review, recheck Tuesday.   Patient seen and examined in therapy gym.  Patient reports his rash has improved.  Reports his pain is well controlled, is not utilizing oxycodone.  Patient denies changes in numbness tingling or weakness.  No lower extremity swelling.  Patient reports he is sleeping well.    Objective:  Physical Exam:  Vitals: /73   Pulse 81   Temp 36.6 °C (97.9 °F) (Oral)   Resp 20   Ht 1.93 m (6' 4\")   Wt 72.6 kg (160 lb 0.9 oz)   SpO2 94%   Gen: NAD, seated comfortably in manual wheelchair  Head:  NC/AT  Eyes/ Nose/ Mouth: PERRLA, moist mucous membranes  Cardio: RRR, good distal perfusion, warm extremities  Pulm: normal respiratory effort, no cyanosis   Abd: Soft NTND, negative borborygmi   Ext: No peripheral edema. No calf tenderness. No clubbing.  Neuro: Left upper extremity flexion contracture, left lower extremity plantar flexion contracture    Mental status:  A&Ox4 (person, place, date, situation) answers questions appropriately follows commands  Speech: fluent, no aphasia or dysarthria      Recent Results (from the past 72 hour(s))   CBC with Differential    Collection Time: 12/21/22  6:33 AM   Result Value Ref Range    WBC 6.4 4.8 - 10.8 K/uL    RBC 4.61 (L) 4.70 - 6.10 M/uL    Hemoglobin 14.5 14.0 - 18.0 g/dL    Hematocrit 43.6 42.0 - 52.0 %    MCV 94.6 81.4 - 97.8 fL    MCH 31.5 27.0 - 33.0 pg    MCHC 33.3 (L) 33.7 - 35.3 g/dL    RDW 41.4 35.9 - 50.0 fL    Platelet Count 309 164 - 446 K/uL    MPV 9.7 9.0 - 12.9 fL    Neutrophils-Polys 58.30 44.00 - 72.00 %    Lymphocytes 27.20 22.00 - 41.00 %    Monocytes 8.40 0.00 - 13.40 %    Eosinophils 4.70 0.00 - 6.90 %    Basophils 0.60 0.00 - 1.80 %    Immature Granulocytes 0.80 0.00 - 0.90 %    Nucleated RBC 0.00 /100 WBC    Neutrophils (Absolute) 3.75 1.82 - 7.42 K/uL    Lymphs (Absolute) 1.75 " 1.00 - 4.80 K/uL    Monos (Absolute) 0.54 0.00 - 0.85 K/uL    Eos (Absolute) 0.30 0.00 - 0.51 K/uL    Baso (Absolute) 0.04 0.00 - 0.12 K/uL    Immature Granulocytes (abs) 0.05 0.00 - 0.11 K/uL    NRBC (Absolute) 0.00 K/uL   Comp Metabolic Panel (CMP)    Collection Time: 12/21/22  6:33 AM   Result Value Ref Range    Sodium 136 135 - 145 mmol/L    Potassium 4.0 3.6 - 5.5 mmol/L    Chloride 100 96 - 112 mmol/L    Co2 29 20 - 33 mmol/L    Anion Gap 7.0 7.0 - 16.0    Glucose 96 65 - 99 mg/dL    Bun 14 8 - 22 mg/dL    Creatinine 0.84 0.50 - 1.40 mg/dL    Calcium 9.3 8.5 - 10.5 mg/dL    AST(SGOT) 114 (H) 12 - 45 U/L    ALT(SGPT) 151 (H) 2 - 50 U/L    Alkaline Phosphatase 110 (H) 30 - 99 U/L    Total Bilirubin 0.6 0.1 - 1.5 mg/dL    Albumin 4.0 3.2 - 4.9 g/dL    Total Protein 7.2 6.0 - 8.2 g/dL    Globulin 3.2 1.9 - 3.5 g/dL    A-G Ratio 1.3 g/dL   HEMOGLOBIN A1C    Collection Time: 12/21/22  6:33 AM   Result Value Ref Range    Glycohemoglobin 5.8 (H) 4.0 - 5.6 %    Est Avg Glucose 120 mg/dL   Vitamin D, 25-hydroxy (blood)    Collection Time: 12/21/22  6:33 AM   Result Value Ref Range    25-Hydroxy   Vitamin D 25 25 (L) 30 - 100 ng/mL   CORRECTED CALCIUM    Collection Time: 12/21/22  6:33 AM   Result Value Ref Range    Correct Calcium 9.3 8.5 - 10.5 mg/dL   ESTIMATED GFR    Collection Time: 12/21/22  6:33 AM   Result Value Ref Range    GFR (CKD-EPI) 120 >60 mL/min/1.73 m 2   COV-2, FLU A/B, AND RSV BY PCR (2-4 HOURS CEPHEID): Collect NP swab in VTM    Collection Time: 12/21/22 12:00 PM    Specimen: Respirate   Result Value Ref Range    Influenza virus A RNA Negative Negative    Influenza virus B, PCR Negative Negative    RSV, PCR Negative Negative    SARS-CoV-2 by PCR NotDetected     SARS-CoV-2 Source NP Swab    Comp Metabolic Panel    Collection Time: 12/22/22  6:23 AM   Result Value Ref Range    Sodium 134 (L) 135 - 145 mmol/L    Potassium 4.4 3.6 - 5.5 mmol/L    Chloride 101 96 - 112 mmol/L    Co2 23 20 - 33 mmol/L     Anion Gap 10.0 7.0 - 16.0    Glucose 98 65 - 99 mg/dL    Bun 15 8 - 22 mg/dL    Creatinine 0.60 0.50 - 1.40 mg/dL    Calcium 9.0 8.5 - 10.5 mg/dL    AST(SGOT) 87 (H) 12 - 45 U/L    ALT(SGPT) 156 (H) 2 - 50 U/L    Alkaline Phosphatase 109 (H) 30 - 99 U/L    Total Bilirubin 0.6 0.1 - 1.5 mg/dL    Albumin 3.7 3.2 - 4.9 g/dL    Total Protein 7.0 6.0 - 8.2 g/dL    Globulin 3.3 1.9 - 3.5 g/dL    A-G Ratio 1.1 g/dL   CORRECTED CALCIUM    Collection Time: 12/22/22  6:23 AM   Result Value Ref Range    Correct Calcium 9.2 8.5 - 10.5 mg/dL   ESTIMATED GFR    Collection Time: 12/22/22  6:23 AM   Result Value Ref Range    GFR (CKD-EPI) 133 >60 mL/min/1.73 m 2       Current Facility-Administered Medications   Medication Frequency    acetaminophen (TYLENOL) tablet 500 mg Q6HRS PRN    vitamin D3 (cholecalciferol) tablet 1,000 Units DAILY    Respiratory Therapy Consult Continuous RT    Pharmacy Consult Request ...Pain Management Review 1 Each PHARMACY TO DOSE    senna-docusate (PERICOLACE or SENOKOT S) 8.6-50 MG per tablet 2 Tablet BID    And    polyethylene glycol/lytes (MIRALAX) PACKET 1 Packet QDAY PRN    And    magnesium hydroxide (MILK OF MAGNESIA) suspension 30 mL QDAY PRN    And    bisacodyl (DULCOLAX) suppository 10 mg QDAY PRN    omeprazole (PRILOSEC) capsule 20 mg DAILY    ondansetron (ZOFRAN ODT) dispertab 4 mg 4X/DAY PRN    Or    ondansetron (ZOFRAN) syringe/vial injection 4 mg 4X/DAY PRN    traZODone (DESYREL) tablet 50 mg QHS PRN    oxyCODONE immediate-release (ROXICODONE) tablet 2.5 mg Q3HRS PRN    Or    oxyCODONE immediate-release (ROXICODONE) tablet 5 mg Q3HRS PRN    enoxaparin (Lovenox) inj 40 mg QHS    cyclobenzaprine (Flexeril) tablet 10 mg TID    benztropine (COGENTIN) tablet 2 mg QHS    ARIPiprazole (Abilify) tablet 10 mg DAILY    citalopram (CeleXA) tablet 40 mg DAILY    OXcarbazepine (TRILEPTAL) tablet 900 mg DAILY    levETIRAcetam (Keppra) injection 500 mg QDAY PRN    gabapentin (NEURONTIN) capsule 300 mg  TID       Orders Placed This Encounter   Procedures    Diet Order Diet: Regular     Standing Status:   Standing     Number of Occurrences:   1     Order Specific Question:   Diet:     Answer:   Regular [1]       _____________________________________  Interdisciplinary Team Conference      Discharge Date/Disposition:  12/30/22 home with family with outpatient clinic   _____________________________________      Assessment:  Active Hospital Problems    Diagnosis     *S/p left hip fracture     History of seizures     COVID-19     Muscle spasm of left lower extremity     Cerebral palsy, hemiplegic (HCC)        Medical Decision Making and Plan:  Left hip fracture  - Sustained during ground-level fall with a slip on ice  - Status post ORIF percutaneous pinning by Dr. Becker on 12/15/2022  - NWB LLE x6 weeks  - Monitor left lower extremity rash, improved  - Initiate comprehensive IRF level therapy with 3 hours of therapy 5 days a week with services from PT/OT     History of seizures  - Continuing patient's home dose Trileptal 900 mg daily  - Patient is allergic to Versed, has anaphylaxis response to benzodiazepines, including cardiac arrest  - mom reportsif patient has a seizure they hold the patient until it subsides, they do not last longer than 5 minutes  - mom reports history of focal type seizures including staring off and recovering shortly  - I have discussed patient's allergy to benzodiazepines with pharmacy, we will plan for IV Keppra for seizures that are uncontrollable     COVID-19  - Incidental COVID-19 finding at time of discharge in anticipation for transferring to rehab  - Patient will need to be in isolation x10 days, would be able to be out of isolation on 12/30  - 12/22 COVID retest is negative, removed from isolation     Cerebral palsy, with spasticity  - Was previously on Flexeril, is tolerating well     History of depression  - Continue patient's home dose Abilify, Celexa  - May benefit from rehab  psychology     Transaminitis  -Elevated LFTs on admission labs  - changed to scheduled Tylenol to Tylenol 500 mg as needed  - LFTs mildly improving as of 12/22    Acute blood loss anemia  - Hemoglobin 12.4 on 12/19, down from 14.7  - 12/21 hemoglobin 14.5     Hyponatremia  - Sodium 132 postoperatively  - 12/22 sodium 134    Pain  - 12/21 started gabapentin 300 TID for pain and to lower seizure threshold  - Tylenol as needed, oxycodone as needed     Bowel  - Constipation prevention, scheduled senna   - As needed stool softeners     GI prophylaxis: Omeprazole     DVT prophylaxis: Lovenox    Total time: 18  minutes.  I spent greater than 50% of the time for patient care and coordination on this date, including unit/floor time, and face-to-face time with the patient as per assessment and plan above including.    Kely Villareal D.O.

## 2022-12-23 NOTE — CARE PLAN
The patient is Watcher - Medium risk of patient condition declining or worsening         Progress made toward(s) clinical / shift goals:    Problem: Pain - Standard  Goal: Alleviation of pain or a reduction in pain to the patient’s comfort goal  Outcome: Progressing  Note: Tylenol 500 mg PO given for C/O left hip pain with relief.     Problem: Fall Risk - Rehab  Goal: Patient will remain free from falls  Note: Pt uses call light consistently and appropriately. Waits for assistance does not attempt self transfer this shift. Able to verbalize needs.

## 2022-12-24 PROCEDURE — A9270 NON-COVERED ITEM OR SERVICE: HCPCS | Performed by: PHYSICAL MEDICINE & REHABILITATION

## 2022-12-24 PROCEDURE — 97535 SELF CARE MNGMENT TRAINING: CPT

## 2022-12-24 PROCEDURE — 97530 THERAPEUTIC ACTIVITIES: CPT

## 2022-12-24 PROCEDURE — 770010 HCHG ROOM/CARE - REHAB SEMI PRIVAT*

## 2022-12-24 PROCEDURE — 97116 GAIT TRAINING THERAPY: CPT

## 2022-12-24 PROCEDURE — 700111 HCHG RX REV CODE 636 W/ 250 OVERRIDE (IP): Performed by: PHYSICAL MEDICINE & REHABILITATION

## 2022-12-24 PROCEDURE — 700102 HCHG RX REV CODE 250 W/ 637 OVERRIDE(OP): Performed by: PHYSICAL MEDICINE & REHABILITATION

## 2022-12-24 RX ADMIN — CYCLOBENZAPRINE 10 MG: 10 TABLET, FILM COATED ORAL at 09:32

## 2022-12-24 RX ADMIN — CITALOPRAM HYDROBROMIDE 40 MG: 20 TABLET ORAL at 09:32

## 2022-12-24 RX ADMIN — BENZTROPINE MESYLATE 2 MG: 1 TABLET ORAL at 21:11

## 2022-12-24 RX ADMIN — SENNOSIDES AND DOCUSATE SODIUM 2 TABLET: 50; 8.6 TABLET ORAL at 21:12

## 2022-12-24 RX ADMIN — OXYCODONE 5 MG: 5 TABLET ORAL at 12:54

## 2022-12-24 RX ADMIN — Medication 1000 UNITS: at 09:32

## 2022-12-24 RX ADMIN — OMEPRAZOLE 20 MG: 20 CAPSULE, DELAYED RELEASE ORAL at 09:32

## 2022-12-24 RX ADMIN — CYCLOBENZAPRINE 10 MG: 10 TABLET, FILM COATED ORAL at 21:12

## 2022-12-24 RX ADMIN — GABAPENTIN 300 MG: 300 CAPSULE ORAL at 14:35

## 2022-12-24 RX ADMIN — ACETAMINOPHEN 500 MG: 500 TABLET ORAL at 12:54

## 2022-12-24 RX ADMIN — ARIPIPRAZOLE 10 MG: 10 TABLET ORAL at 09:31

## 2022-12-24 RX ADMIN — CYCLOBENZAPRINE 10 MG: 10 TABLET, FILM COATED ORAL at 14:35

## 2022-12-24 RX ADMIN — GABAPENTIN 300 MG: 300 CAPSULE ORAL at 21:12

## 2022-12-24 RX ADMIN — ENOXAPARIN SODIUM 40 MG: 40 INJECTION SUBCUTANEOUS at 21:00

## 2022-12-24 RX ADMIN — SENNOSIDES AND DOCUSATE SODIUM 2 TABLET: 50; 8.6 TABLET ORAL at 09:31

## 2022-12-24 RX ADMIN — OXYCODONE 5 MG: 5 TABLET ORAL at 09:39

## 2022-12-24 RX ADMIN — GABAPENTIN 300 MG: 300 CAPSULE ORAL at 09:32

## 2022-12-24 RX ADMIN — OXCARBAZEPINE 900 MG: 300 TABLET, FILM COATED ORAL at 09:32

## 2022-12-24 ASSESSMENT — GAIT ASSESSMENTS
DISTANCE (FEET): 40
DEVIATION: DECREASED BASE OF SUPPORT
GAIT LEVEL OF ASSIST: MINIMAL ASSIST
ASSISTIVE DEVICE: PLATFORM WALKER

## 2022-12-24 ASSESSMENT — ACTIVITIES OF DAILY LIVING (ADL)
BED_CHAIR_WHEELCHAIR_TRANSFER_DESCRIPTION: ADAPTIVE EQUIPMENT;INCREASED TIME;SET-UP OF EQUIPMENT;SUPERVISION FOR SAFETY
TOILET_TRANSFER_DESCRIPTION: ADAPTIVE EQUIPMENT;GRAB BAR;INCREASED TIME;SUPERVISION FOR SAFETY
TOILETING_LEVEL_OF_ASSIST_DESCRIPTION: INCREASED TIME;SUPERVISION FOR SAFETY;ADAPTIVE EQUIPMENT

## 2022-12-24 ASSESSMENT — PAIN DESCRIPTION - PAIN TYPE
TYPE: ACUTE PAIN

## 2022-12-24 NOTE — THERAPY
Occupational Therapy  Daily Treatment     Patient Name: Cheng Foster  Age:  32 y.o., Sex:  male  Medical Record #: [id number]  Today's Date: 12/24/2022     Precautions  Precautions: Fall Risk, Non Weight Bearing Left Lower Extremity  Comments: hx CP w/ chronic L hemiparesis and spasticity, NWB x6 weeks (starting 12/15), seizure prec         Subjective    Pt received in cafeteria, agreeable to OT session.      Objective     12/24/22 1031   OT Charge Group   OT Therapy Activity (Units) 2   OT Total Time Spent   OT Individual Total Time Spent (Mins) 30   IADL Treatments   Kitchen Mobility Education Educated pt on w/c level kitchen mob including opening the fridge, low cabinets, and positioning self in front of microwave above the stove to place and retrieve plate. Pt demo'd ability to complete task w/c level reaching up as well as standing with CGA.   Interdisciplinary Plan of Care Collaboration   Patient Position at End of Therapy Seated;Other (Comments)  (pt in cafeteria at end of session)     Pt completed standing tolerance activity (checkers) at raised table with CGA, ~10 min.     Assessment    Pt tolerated OT session well. Demo's kitchen w/c mob with supervision, CGA to stand at counter for microwave mgmt. Tolerated standing for duration of playing checkers with good adherence to NWB precaution and no c/o of RLE fatigue or discomfort.     Strengths: Able to follow instructions, Alert and oriented, Effective communication skills, Independent prior level of function, Motivated for self care and independence, Pleasant and cooperative, Supportive family, Willingly participates in therapeutic activities  Barriers: Decreased endurance, Fatigue, Home accessibility, Impaired activity tolerance, Impaired balance, Pain, Spasticity (NWB LLE)    Plan    Kitchen mobility (pt makes microwave meals), balance, ADLs, functional transfers using platform walker.    Passport items to be completed:  Perform bathroom transfers,  complete dressing, complete feeding, get ready for the day, prepare a simple meal, participate in household tasks, adapt home for safety needs, demonstrate home exercise program, complete caregiver training     Occupational Therapy Goals (Active)       Problem: Functional Transfers       Dates: Start: 12/21/22         Goal: STG-Within one week, patient will transfer to tub/shower       Dates: Start: 12/21/22   Expected End: 12/30/22       Description: (Simulate tub/shower set up) using AD/DME as needed.      Goal Note filed on 12/22/22 1151 by Tana Caceres OT       To be addressed.                  Problem: OT Long Term Goals       Dates: Start: 12/21/22         Goal: LTG-By discharge, patient will complete basic self care tasks at supv to mod I level using AE as needed.        Dates: Start: 12/21/22   Expected End: 12/30/22            Goal: LTG-By discharge, patient will perform bathroom transfers at supv to mod I level using AD/DME as needed.        Dates: Start: 12/21/22   Expected End: 12/30/22               Problem: Toileting       Dates: Start: 12/21/22         Goal: STG-Within one week, patient will complete toileting tasks with CGA-SBA using AE as needed.        Dates: Start: 12/21/22   Expected End: 12/30/22         Goal Note filed on 12/22/22 1151 by Tana Caceres, OT       Min-mod A

## 2022-12-24 NOTE — THERAPY
Physical Therapy   Daily Treatment     Patient Name: Cheng Foster  Age:  32 y.o., Sex:  male  Medical Record #: [id number]  Today's Date: 12/24/2022     Precautions  Precautions: Fall Risk, Non Weight Bearing Left Lower Extremity  Comments: hx CP w/ chronic L hemiparesis and spasticity, NWB x6 weeks (starting 12/15), seizure prec    Subjective    Patient is agreeable to participate     Objective       12/24/22 0931 12/24/22 1331   PT Charge Group   PT Gait Training (Units)  --  4   PT Therapeutic Activities (Units) 2  --    PT Total Time Spent   PT Individual Total Time Spent (Mins) 30 60   Gait Functional Level of Assist    Gait Level Of Assist  --  Minimal Assist   Assistive Device  --  Platform Walker   Distance (Feet)  --  40   # of Times Distance was Traveled  --  3  (40, 60, 70 for total of 170.)   Deviation  --  Decreased Base Of Support  (intermittent postural wobbles, is able to recovery by slowing his speed and leaning onto FWW)   Transfer Functional Level of Assist   Bed, Chair, Wheelchair Transfer  --  Contact Guard Assist   Bed Chair Wheelchair Transfer Description  --  Adaptive equipment;Increased time;Set-up of equipment;Supervision for safety   Neuro-Muscular Treatments   Neuro-Muscular Treatments Anterior weight shift;Postural Changes;Postural Facilitation  --    Comments standing tolerance while performing edna task with RUE, tolerates standing approx 5 minutes before needing seated rest break.  --    Interdisciplinary Plan of Care Collaboration   IDT Collaboration with  Occupational Therapist  --    Patient Position at End of Therapy Seated;Self Releasing Lap Belt Applied;Phone within Reach Seated;Self Releasing Lap Belt Applied;Phone within Reach   Collaboration Comments rolling in halls independently left to mobilize in halls independently         Assessment    Standing tolerance with RUE dexterity task is approx 5 mins with LUE support on platform walker. He demonstrates improving  ambulation over firm level well lit ground free of clutter in gym space. He may have increased difficulty in home environment with carpet and platform walker. He has made significant activity tolerance gains, has improved his preparedness for therapy by being mindful of medication schedule and initiating pain meds accordingly.    Strengths: Able to follow instructions, Independent prior level of function, Motivated for self care and independence, Pleasant and cooperative, Supportive family, Willingly participates in therapeutic activities  Barriers: Impaired activity tolerance, Impaired balance, Pain, Spasticity, Generalized weakness    Plan    Progress distance ambulated with platform FWW as tolerated, strength training as able given LLE tone/ pain    Passport items to be completed:  Get in/out of bed safely, in/out of a vehicle, safely use mobility device, walk or wheel around home/community, navigate up and down stairs, show how to get up/down from the ground, ensure home is accessible, demonstrate HEP, complete caregiver training    Physical Therapy Problems (Active)       Problem: Balance       Dates: Start: 12/21/22         Goal: STG-Within one week, patient will maintain static standing on carpet/ foam with platform walker x 2 minutes       Dates: Start: 12/21/22   Expected End: 12/30/22               Problem: Mobility       Dates: Start: 12/21/22         Goal: STG-Within one week, patient will ambulate 50 ft with platform walker and contact guard assist       Dates: Start: 12/21/22   Expected End: 12/30/22            Goal: STG-Within one week, patient will ambulate up/down a curb with platform walker and contact guard assist       Dates: Start: 12/21/22   Expected End: 12/30/22               Problem: Mobility Transfers       Dates: Start: 12/21/22         Goal: STG-Within one week, patient will transfer bed to chair with julio walker and supervision assist       Dates: Start: 12/21/22   Expected End: 12/30/22                Problem: PT-Long Term Goals       Dates: Start: 12/21/22         Goal: LTG-By discharge, patient will ambulate 75 ft with platform walker and supervision assist       Dates: Start: 12/21/22   Expected End: 12/30/22            Goal: LTG-By discharge, patient will perform home exercise program from handouts with minimal cuing       Dates: Start: 12/21/22   Expected End: 12/30/22            Goal: LTG-By discharge, patient will transfer in/out of a car with supervision assist and LRAD       Dates: Start: 12/21/22   Expected End: 12/30/22

## 2022-12-24 NOTE — CARE PLAN
"  Problem: Knowledge Deficit - Standard  Goal: Patient and family/care givers will demonstrate understanding of plan of care, disease process/condition, diagnostic tests and medications  Outcome: Progressing     Problem: Fall Risk - Rehab  Goal: Patient will remain free from falls  Outcome: Progressing  Note: Nadiya Moss Fall risk Assessment Score: 11    Moderate fall risk Interventions  - Pt refuses bed and strip alarm   - Yellow sign by the door   - Yellow wrist band \"Fall risk\"  - Do not leave patient unattended in the bathroom  - Fall risk education provided     "

## 2022-12-24 NOTE — THERAPY
"Occupational Therapy  Daily Treatment     Patient Name: Cheng Foster  Age:  32 y.o., Sex:  male  Medical Record #: [id number]  Today's Date: 12/24/2022     Precautions  Precautions: (P) Fall Risk, Non Weight Bearing Left Lower Extremity  Comments: (P) hx CP w/ chronic L hemiparesis and spasticity, NWB x6 weeks (starting 12/15), seizure prec         Subjective    Pt seated up in w/c, agreeable to OT session.      Objective       12/24/22 0831   OT Charge Group   OT Self Care / ADL (Units) 2   OT Therapy Activity (Units) 2   OT Total Time Spent   OT Individual Total Time Spent (Mins) 60   Precautions   Precautions Fall Risk;Non Weight Bearing Left Lower Extremity   Comments hx CP w/ chronic L hemiparesis and spasticity, NWB x6 weeks (starting 12/15), seizure prec   Functional Level of Assist   Toileting Standby Assist   Toileting Description Increased time;Supervision for safety;Adaptive equipment  (platform walker, close SBA for clothing mgt; standing at toilet to void)   Toilet Transfers Standby Assist   Toilet Transfer Description Adaptive equipment;Grab bar;Increased time;Supervision for safety  (platform walker level, standing at toilet to void)   Tub / Shower Transfers Standby Assist   Tub Shower Transfer Description Adaptive equipment;Shower bench;Increased time;Supervision for safety;Set-up of equipment  (dry tub/shower txfr from platform walker level from doorway<>tub txfr bench)   Balance   Standing Balance (Static) Fair -   Comments static standing balance while engaging in preferred leisure activity 5x~3 min bouts with CGA-SBA, no LOB.   Interdisciplinary Plan of Care Collaboration   IDT Collaboration with  Nursing   Patient Position at End of Therapy Seated;Chair Alarm On;Self Releasing Lap Belt Applied;Other (Comments)   Collaboration Comments transfer of care to RN for medication       Pt called his mother to obtain bathroom doorway measurement (30\" wide) to ensure that platform walker or w/c will " fit through doorway. Pt was able to complete tub/shower txfr and toilet txfrs from platform walker level starting at bathroom doorway with SBA.     Assessment    Pt progressing with standing tolerance and balance during ADLs and leisure activities. He has sent bathroom equipment recommendations to his family, including tub txfr bench and commode.   Strengths: Able to follow instructions, Alert and oriented, Effective communication skills, Independent prior level of function, Motivated for self care and independence, Pleasant and cooperative, Supportive family, Willingly participates in therapeutic activities  Barriers: Decreased endurance, Fatigue, Home accessibility, Impaired activity tolerance, Impaired balance, Pain, Spasticity (NWB LLE)    Plan    Kitchen mobility (pt makes microwave meals), balance, ADLs, functional transfers using platform walker.       Occupational Therapy Goals (Active)       Problem: Functional Transfers       Dates: Start: 12/21/22         Goal: STG-Within one week, patient will transfer to tub/shower       Dates: Start: 12/21/22   Expected End: 12/30/22       Description: (Simulate tub/shower set up) using AD/DME as needed.      Goal Note filed on 12/22/22 1151 by Tana Caceres OT       To be addressed.                  Problem: OT Long Term Goals       Dates: Start: 12/21/22         Goal: LTG-By discharge, patient will complete basic self care tasks at supv to mod I level using AE as needed.        Dates: Start: 12/21/22   Expected End: 12/30/22            Goal: LTG-By discharge, patient will perform bathroom transfers at supv to mod I level using AD/DME as needed.        Dates: Start: 12/21/22   Expected End: 12/30/22               Problem: Toileting       Dates: Start: 12/21/22         Goal: STG-Within one week, patient will complete toileting tasks with CGA-SBA using AE as needed.        Dates: Start: 12/21/22   Expected End: 12/30/22         Goal Note filed on 12/22/22 1151 by  Tana Caceres, OT       Min-mod A

## 2022-12-24 NOTE — CARE PLAN
Problem: Knowledge Deficit - Standard  Goal: Patient and family/care givers will demonstrate understanding of plan of care, disease process/condition, diagnostic tests and medications  Outcome: Progressing     Problem: Pain - Standard  Goal: Alleviation of pain or a reduction in pain to the patient’s comfort goal  Outcome: Progressing   The patient is Stable - Low risk of patient condition declining or worsening    Shift Goals  Patient Goals: sleep well pain control    Progress made toward(s) clinical / shift goals:  Patient resting in bed    Patient is not progressing towards the following goals:

## 2022-12-25 PROCEDURE — 700102 HCHG RX REV CODE 250 W/ 637 OVERRIDE(OP): Performed by: PHYSICAL MEDICINE & REHABILITATION

## 2022-12-25 PROCEDURE — 700111 HCHG RX REV CODE 636 W/ 250 OVERRIDE (IP): Performed by: PHYSICAL MEDICINE & REHABILITATION

## 2022-12-25 PROCEDURE — 770010 HCHG ROOM/CARE - REHAB SEMI PRIVAT*

## 2022-12-25 PROCEDURE — A9270 NON-COVERED ITEM OR SERVICE: HCPCS | Performed by: PHYSICAL MEDICINE & REHABILITATION

## 2022-12-25 RX ADMIN — GABAPENTIN 300 MG: 300 CAPSULE ORAL at 13:32

## 2022-12-25 RX ADMIN — BENZTROPINE MESYLATE 2 MG: 1 TABLET ORAL at 20:34

## 2022-12-25 RX ADMIN — CITALOPRAM HYDROBROMIDE 40 MG: 20 TABLET ORAL at 08:18

## 2022-12-25 RX ADMIN — OXYCODONE 5 MG: 5 TABLET ORAL at 20:36

## 2022-12-25 RX ADMIN — CYCLOBENZAPRINE 10 MG: 10 TABLET, FILM COATED ORAL at 13:32

## 2022-12-25 RX ADMIN — GABAPENTIN 300 MG: 300 CAPSULE ORAL at 20:34

## 2022-12-25 RX ADMIN — OXYCODONE 5 MG: 5 TABLET ORAL at 11:53

## 2022-12-25 RX ADMIN — ARIPIPRAZOLE 10 MG: 10 TABLET ORAL at 08:18

## 2022-12-25 RX ADMIN — OXYCODONE 5 MG: 5 TABLET ORAL at 08:17

## 2022-12-25 RX ADMIN — GABAPENTIN 300 MG: 300 CAPSULE ORAL at 08:18

## 2022-12-25 RX ADMIN — OXCARBAZEPINE 900 MG: 300 TABLET, FILM COATED ORAL at 08:18

## 2022-12-25 RX ADMIN — CYCLOBENZAPRINE 10 MG: 10 TABLET, FILM COATED ORAL at 20:34

## 2022-12-25 RX ADMIN — TRAZODONE HYDROCHLORIDE 50 MG: 50 TABLET ORAL at 20:36

## 2022-12-25 RX ADMIN — ENOXAPARIN SODIUM 40 MG: 40 INJECTION SUBCUTANEOUS at 20:37

## 2022-12-25 RX ADMIN — CYCLOBENZAPRINE 10 MG: 10 TABLET, FILM COATED ORAL at 08:18

## 2022-12-25 RX ADMIN — OMEPRAZOLE 20 MG: 20 CAPSULE, DELAYED RELEASE ORAL at 08:18

## 2022-12-25 RX ADMIN — ACETAMINOPHEN 500 MG: 500 TABLET ORAL at 08:16

## 2022-12-25 RX ADMIN — Medication 1000 UNITS: at 08:18

## 2022-12-25 RX ADMIN — OXYCODONE 5 MG: 5 TABLET ORAL at 02:31

## 2022-12-25 ASSESSMENT — FIBROSIS 4 INDEX: FIB4 SCORE: 0.68

## 2022-12-25 NOTE — CARE PLAN
Problem: Knowledge Deficit - Standard  Goal: Patient and family/care givers will demonstrate understanding of plan of care, disease process/condition, diagnostic tests and medications  Outcome: Progressing   Pt education given regarding plan of care, pt shows good understanding, will continue to reinforce education and continue to monitor.             Problem: Fall Risk - Rehab  Goal: Patient will remain free from falls  Outcome: Progressing     Pt education given regarding fall precautions AND safety measures, pt shows good understanding, has not attempted to self transfer this shift, will continue to reinforce education and continue to monitor.

## 2022-12-26 PROCEDURE — 97116 GAIT TRAINING THERAPY: CPT

## 2022-12-26 PROCEDURE — A9270 NON-COVERED ITEM OR SERVICE: HCPCS | Performed by: PHYSICAL MEDICINE & REHABILITATION

## 2022-12-26 PROCEDURE — 770010 HCHG ROOM/CARE - REHAB SEMI PRIVAT*

## 2022-12-26 PROCEDURE — 97110 THERAPEUTIC EXERCISES: CPT

## 2022-12-26 PROCEDURE — 700111 HCHG RX REV CODE 636 W/ 250 OVERRIDE (IP): Performed by: PHYSICAL MEDICINE & REHABILITATION

## 2022-12-26 PROCEDURE — 97530 THERAPEUTIC ACTIVITIES: CPT

## 2022-12-26 PROCEDURE — 97535 SELF CARE MNGMENT TRAINING: CPT

## 2022-12-26 PROCEDURE — 700102 HCHG RX REV CODE 250 W/ 637 OVERRIDE(OP): Performed by: PHYSICAL MEDICINE & REHABILITATION

## 2022-12-26 PROCEDURE — 97112 NEUROMUSCULAR REEDUCATION: CPT

## 2022-12-26 RX ADMIN — BENZTROPINE MESYLATE 2 MG: 1 TABLET ORAL at 21:13

## 2022-12-26 RX ADMIN — OMEPRAZOLE 20 MG: 20 CAPSULE, DELAYED RELEASE ORAL at 08:08

## 2022-12-26 RX ADMIN — Medication 1000 UNITS: at 08:09

## 2022-12-26 RX ADMIN — CYCLOBENZAPRINE 10 MG: 10 TABLET, FILM COATED ORAL at 08:09

## 2022-12-26 RX ADMIN — GABAPENTIN 300 MG: 300 CAPSULE ORAL at 08:09

## 2022-12-26 RX ADMIN — CYCLOBENZAPRINE 10 MG: 10 TABLET, FILM COATED ORAL at 21:13

## 2022-12-26 RX ADMIN — ARIPIPRAZOLE 10 MG: 10 TABLET ORAL at 08:09

## 2022-12-26 RX ADMIN — SENNOSIDES AND DOCUSATE SODIUM 2 TABLET: 50; 8.6 TABLET ORAL at 21:13

## 2022-12-26 RX ADMIN — GABAPENTIN 300 MG: 300 CAPSULE ORAL at 14:33

## 2022-12-26 RX ADMIN — SENNOSIDES AND DOCUSATE SODIUM 2 TABLET: 50; 8.6 TABLET ORAL at 08:08

## 2022-12-26 RX ADMIN — OXYCODONE 5 MG: 5 TABLET ORAL at 05:39

## 2022-12-26 RX ADMIN — ENOXAPARIN SODIUM 40 MG: 40 INJECTION SUBCUTANEOUS at 21:12

## 2022-12-26 RX ADMIN — GABAPENTIN 300 MG: 300 CAPSULE ORAL at 21:13

## 2022-12-26 RX ADMIN — CITALOPRAM HYDROBROMIDE 40 MG: 20 TABLET ORAL at 08:08

## 2022-12-26 RX ADMIN — OXCARBAZEPINE 900 MG: 300 TABLET, FILM COATED ORAL at 08:08

## 2022-12-26 RX ADMIN — CYCLOBENZAPRINE 10 MG: 10 TABLET, FILM COATED ORAL at 14:33

## 2022-12-26 ASSESSMENT — ACTIVITIES OF DAILY LIVING (ADL)
TOILETING_LEVEL_OF_ASSIST_DESCRIPTION: GRAB BAR;INCREASED TIME;SUPERVISION FOR SAFETY
TOILET_TRANSFER_DESCRIPTION: GRAB BAR;INCREASED TIME;SUPERVISION FOR SAFETY

## 2022-12-26 ASSESSMENT — GAIT ASSESSMENTS
DISTANCE (FEET): 30
GAIT LEVEL OF ASSIST: CONTACT GUARD ASSIST
ASSISTIVE DEVICE: PLATFORM WALKER

## 2022-12-26 NOTE — THERAPY
Physical Therapy   Daily Treatment     Patient Name: Cheng Foster  Age:  32 y.o., Sex:  male  Medical Record #: [id number]  Today's Date: 12/26/2022     Precautions  Precautions: (P) Fall Risk, Non Weight Bearing Left Lower Extremity  Comments: (P) hx CP w/ chronic L hemiparesis and spasticity, NWB x6 weeks (starting 12/15), seizure prec    Subjective    Pt received in gym, requesting to stand and play tiburcio cart.     Objective       12/26/22 1401   PT Charge Group   PT Therapeutic Activities (Units) 2   PT Total Time Spent   PT Individual Total Time Spent (Mins) 30   Precautions   Precautions Fall Risk;Non Weight Bearing Left Lower Extremity   Comments hx CP w/ chronic L hemiparesis and spasticity, NWB x6 weeks (starting 12/15), seizure prec   Cognition    Level of Consciousness Alert   Sleep/Wake Cycle   Sleep & Rest Awake;Out of bed   Bed Mobility    Sit to Stand Supervised   Interdisciplinary Plan of Care Collaboration   Patient Position at End of Therapy Seated;Self Releasing Lap Belt Applied   Collaboration Comments Mod I in manual wc on unit         Assessment    Pt stood x13' with platform walker with Mod I, standing tolerance limited by pt reports of fatigue.    Strengths: Able to follow instructions, Independent prior level of function, Motivated for self care and independence, Pleasant and cooperative, Supportive family, Willingly participates in therapeutic activities  Barriers: Impaired activity tolerance, Impaired balance, Pain, Spasticity, Generalized weakness    Plan    Progress distance ambulated with platform FWW as tolerated, strength training as able given LLE tone/ pain      Physical Therapy Problems (Active)       Problem: Balance       Dates: Start: 12/21/22         Goal: STG-Within one week, patient will maintain static standing on carpet/ foam with platform walker x 2 minutes       Dates: Start: 12/21/22   Expected End: 12/30/22               Problem: Mobility       Dates: Start: 12/21/22          Goal: STG-Within one week, patient will ambulate 50 ft with platform walker and contact guard assist       Dates: Start: 12/21/22   Expected End: 12/30/22            Goal: STG-Within one week, patient will ambulate up/down a curb with platform walker and contact guard assist       Dates: Start: 12/21/22   Expected End: 12/30/22               Problem: Mobility Transfers       Dates: Start: 12/21/22         Goal: STG-Within one week, patient will transfer bed to chair with julio walker and supervision assist       Dates: Start: 12/21/22   Expected End: 12/30/22               Problem: PT-Long Term Goals       Dates: Start: 12/21/22         Goal: LTG-By discharge, patient will ambulate 75 ft with platform walker and supervision assist       Dates: Start: 12/21/22   Expected End: 12/30/22            Goal: LTG-By discharge, patient will perform home exercise program from handouts with minimal cuing       Dates: Start: 12/21/22   Expected End: 12/30/22            Goal: LTG-By discharge, patient will transfer in/out of a car with supervision assist and LRAD       Dates: Start: 12/21/22   Expected End: 12/30/22

## 2022-12-26 NOTE — THERAPY
Physical Therapy   Daily Treatment     Patient Name: Cheng Foster  Age:  32 y.o., Sex:  male  Medical Record #: [id number]  Today's Date: 12/26/2022     Precautions  Precautions: Fall Risk, Non Weight Bearing Left Lower Extremity  Comments: hx CP w/ chronic L hemiparesis and spasticity, NWB x6 weeks (starting 12/15), seizure prec    Subjective    Pt received in hallway in  ready to begin PT session. He notes his mom is going to install a ramp at the entrway at home.     Objective       12/26/22 0931   PT Charge Group   PT Gait Training (Units) 1   PT Therapeutic Exercise (Units) 1   PT Therapeutic Activities (Units) 2   PT Total Time Spent   PT Individual Total Time Spent (Mins) 60   Precautions   Precautions Fall Risk;Non Weight Bearing Left Lower Extremity   Comments hx CP w/ chronic L hemiparesis and spasticity, NWB x6 weeks (starting 12/15), seizure prec   Cognition    Level of Consciousness Alert   Sleep/Wake Cycle   Sleep & Rest Awake;Out of bed   Gait Functional Level of Assist    Gait Level Of Assist Contact Guard Assist   Assistive Device Platform Walker  (L platform walker)   Distance (Feet) 30   # of Times Distance was Traveled 2   Wheelchair Functional Level of Assist   Wheelchair Assist Modified Independent   Transfer Functional Level of Assist   Bed, Chair, Wheelchair Transfer Modified Independent   Supine Lower Body Exercise   Short Arc Quad 3 sets of 10;Bilateral  (3# R ankle cuff weight)   Other Exercises R SL bridge 2x6 and x2 reps   Bed Mobility    Supine to Sit Modified Independent  (following multiple failed attempts of log roll, pt then pushed up from supine with body oriented facing EOM, and successfully used R UE to push up to sitting then scooted forward to EOM)   Sit to Supine Modified Independent   Sit to Stand Modified Independent   Scooting Modified Independent   Interdisciplinary Plan of Care Collaboration   Patient Position at End of Therapy Seated;Self Releasing Lap Belt Applied    Collaboration Comments in wc rolling to gym at end of session         Assessment    Following mat<>wc squat-pivot with reach tx to R and L pt Mod I. He ambulated up to ~30' with platform walker and SBA. Maintained NWB L LE without any cues throughout all ambulation and transfers.     Strengths: Able to follow instructions, Independent prior level of function, Motivated for self care and independence, Pleasant and cooperative, Supportive family, Willingly participates in therapeutic activities  Barriers: Impaired activity tolerance, Impaired balance, Pain, Spasticity, Generalized weakness    Plan    Progress distance ambulated with platform FWW as tolerated, strength training as able given LLE tone/ pain        Physical Therapy Problems (Active)       Problem: Balance       Dates: Start: 12/21/22         Goal: STG-Within one week, patient will maintain static standing on carpet/ foam with platform walker x 2 minutes       Dates: Start: 12/21/22   Expected End: 12/30/22               Problem: Mobility       Dates: Start: 12/21/22         Goal: STG-Within one week, patient will ambulate 50 ft with platform walker and contact guard assist       Dates: Start: 12/21/22   Expected End: 12/30/22            Goal: STG-Within one week, patient will ambulate up/down a curb with platform walker and contact guard assist       Dates: Start: 12/21/22   Expected End: 12/30/22               Problem: Mobility Transfers       Dates: Start: 12/21/22         Goal: STG-Within one week, patient will transfer bed to chair with julio walker and supervision assist       Dates: Start: 12/21/22   Expected End: 12/30/22               Problem: PT-Long Term Goals       Dates: Start: 12/21/22         Goal: LTG-By discharge, patient will ambulate 75 ft with platform walker and supervision assist       Dates: Start: 12/21/22   Expected End: 12/30/22            Goal: LTG-By discharge, patient will perform home exercise program from handouts with  minimal cuing       Dates: Start: 12/21/22   Expected End: 12/30/22            Goal: LTG-By discharge, patient will transfer in/out of a car with supervision assist and LRAD       Dates: Start: 12/21/22   Expected End: 12/30/22

## 2022-12-26 NOTE — CARE PLAN
The patient is Stable - Low risk of patient condition declining or worsening    Shift Goals  Clinical Goals: pain control  Patient Goals: sleep with no pain    Progress made toward(s) clinical / shift goals:      Problem: Knowledge Deficit - Standard  Goal: Patient and family/care givers will demonstrate understanding of plan of care, disease process/condition, diagnostic tests and medications  Outcome: Progressing     Problem: Pain - Standard  Goal: Alleviation of pain or a reduction in pain to the patient’s comfort goal  Outcome: Progressing       Patient is not progressing towards the following goals:

## 2022-12-26 NOTE — CARE PLAN
Problem: Pain - Standard  Goal: Alleviation of pain or a reduction in pain to the patient’s comfort goal  Flowsheets (Taken 12/26/2022 0800)  Non Verbal Scale:   Calm   Unlabored Breathing  Pain Rating Scale (NPRS): 0     Problem: Fall Risk - Rehab  Goal: Patient will remain free from falls  Note: Patient remains free from falls this shift. Patient was educated on using the call light to prevent falls. Patients bed is in the lowest position. The patients belongings are placed in near proximity to the patient.     The patient is Stable - Low risk of patient condition declining or worsening

## 2022-12-26 NOTE — THERAPY
Occupational Therapy  Daily Treatment     Patient Name: Cheng Foster  Age:  32 y.o., Sex:  male  Medical Record #: [id number]  Today's Date: 12/26/2022     Precautions  Precautions: (P) Fall Risk, Non Weight Bearing Left Lower Extremity  Comments: (P) hx CP w/ chronic L hemiparesis and spasticity, NWB x6 weeks (starting 12/15), seizure prec         Subjective    Pt seen for OT from 11-11:30 with focus on ADLs and balance, and from 13:00-14:00 with focus on w/c mobility and balance.      Objective       12/26/22 1101   OT Charge Group   OT Self Care / ADL (Units) 1   OT Neuromuscular Re-education / Balance (Units) 2   OT Therapy Activity (Units) 3   OT Total Time Spent   OT Individual Total Time Spent (Mins) 90   Precautions   Precautions Fall Risk;Non Weight Bearing Left Lower Extremity   Comments hx CP w/ chronic L hemiparesis and spasticity, NWB x6 weeks (starting 12/15), seizure prec   Cognition    Level of Consciousness Alert   Functional Level of Assist   Grooming Modified Independent;Seated   Grooming Description Increased time;Seated in wheelchair at sink  (pt completed shaving and oral care seated at sink)   Toileting Supervision   Toileting Description Grab bar;Increased time;Supervision for safety  (distant supv)   Toilet Transfers Supervised   Toilet Transfer Description Grab bar;Increased time;Supervision for safety  (w/c<>toilet SPT with distant supv)   Interdisciplinary Plan of Care Collaboration   Patient Position at End of Therapy Seated;Self Releasing Lap Belt Applied;Other (Comments)  (seated in therapy gym)       11-11:30: standing tolerance/balance in // bars while engaging in RUE dynamic reach and beanbag toss activity x10 min with intermittent seated rest breaks, supv, no LOB.    13:00-14:00: w/c mobility outdoors over variable surfaces x1500 ft with supv. Ladderball activity standing at platform walker x3 rounds with supv, no LOB. Static standing balance at platform walker while engaging in  preferred leisure activity of Raymon Romano, 3x ~3 min bouts of standing with supv, no LOB.    Assessment    Pt tolerated OT sessions well today, completed all ADLs, functional transfers and standing tolerance activities at supv to mod I level with no LOB. Pt consistently maintains NWB LLE prec. Pt hoping to be able to d/c home before Friday if family is able to pick him up.   Strengths: Able to follow instructions, Alert and oriented, Effective communication skills, Independent prior level of function, Motivated for self care and independence, Pleasant and cooperative, Supportive family, Willingly participates in therapeutic activities  Barriers: Decreased endurance, Fatigue, Home accessibility, Impaired activity tolerance, Impaired balance, Pain, Spasticity (NWB LLE)    Plan    ADLs, IADLs, balance, platform walker.     Passport items to be completed:  adapt home for safety needs, demonstrate home exercise program, complete caregiver training     Occupational Therapy Goals (Active)       Problem: Functional Transfers       Dates: Start: 12/21/22         Goal: STG-Within one week, patient will transfer to tub/shower       Dates: Start: 12/21/22   Expected End: 12/30/22       Description: (Simulate tub/shower set up) using AD/DME as needed.      Goal Note filed on 12/22/22 1151 by Tana Caceres OT       To be addressed.                  Problem: OT Long Term Goals       Dates: Start: 12/21/22         Goal: LTG-By discharge, patient will complete basic self care tasks at supv to mod I level using AE as needed.        Dates: Start: 12/21/22   Expected End: 12/30/22            Goal: LTG-By discharge, patient will perform bathroom transfers at supv to mod I level using AD/DME as needed.        Dates: Start: 12/21/22   Expected End: 12/30/22               Problem: Toileting       Dates: Start: 12/21/22         Goal: STG-Within one week, patient will complete toileting tasks with CGA-SBA using AE as needed.        Dates:  Start: 12/21/22   Expected End: 12/30/22         Goal Note filed on 12/22/22 1151 by Tana Caceres, OT       Min-mod A

## 2022-12-26 NOTE — CARE PLAN
The patient is Stable - Low risk of patient condition declining or worsening    Shift Goals  Clinical Goals: pain control  Patient Goals: sleep with no pain    Progress made toward(s) clinical / shift goals:      Problem: Pain - Standard  Goal: Alleviation of pain or a reduction in pain to the patient’s comfort goal  Outcome: Progressing  Note: Roxicodone 5mg given PRN per MAR for c/o left side pain with adequate relief. Pt sleeps good.with PRN trazodone. Will continue to monitor.     Problem: Bladder / Voiding  Goal: Patient will establish and maintain regular urinary output  Outcome: Progressing  Note: Pt continent of bladder this shift. Voiding adequately using urinal. He denies dysuria.       Patient is not progressing towards the following goals:

## 2022-12-27 VITALS
BODY MASS INDEX: 26.55 KG/M2 | SYSTOLIC BLOOD PRESSURE: 129 MMHG | WEIGHT: 218 LBS | HEART RATE: 92 BPM | HEIGHT: 76 IN | TEMPERATURE: 98.6 F | OXYGEN SATURATION: 94 % | DIASTOLIC BLOOD PRESSURE: 73 MMHG | RESPIRATION RATE: 18 BRPM

## 2022-12-27 LAB
ANION GAP SERPL CALC-SCNC: 7 MMOL/L (ref 7–16)
BUN SERPL-MCNC: 10 MG/DL (ref 8–22)
CALCIUM SERPL-MCNC: 9.3 MG/DL (ref 8.5–10.5)
CHLORIDE SERPL-SCNC: 96 MMOL/L (ref 96–112)
CO2 SERPL-SCNC: 27 MMOL/L (ref 20–33)
CREAT SERPL-MCNC: 0.72 MG/DL (ref 0.5–1.4)
ERYTHROCYTE [DISTWIDTH] IN BLOOD BY AUTOMATED COUNT: 39.9 FL (ref 35.9–50)
GFR SERPLBLD CREATININE-BSD FMLA CKD-EPI: 126 ML/MIN/1.73 M 2
GLUCOSE SERPL-MCNC: 88 MG/DL (ref 65–99)
HCT VFR BLD AUTO: 39.4 % (ref 42–52)
HGB BLD-MCNC: 13.3 G/DL (ref 14–18)
MCH RBC QN AUTO: 31.1 PG (ref 27–33)
MCHC RBC AUTO-ENTMCNC: 33.8 G/DL (ref 33.7–35.3)
MCV RBC AUTO: 92.3 FL (ref 81.4–97.8)
PLATELET # BLD AUTO: 332 K/UL (ref 164–446)
PMV BLD AUTO: 9.5 FL (ref 9–12.9)
POTASSIUM SERPL-SCNC: 4 MMOL/L (ref 3.6–5.5)
RBC # BLD AUTO: 4.27 M/UL (ref 4.7–6.1)
SODIUM SERPL-SCNC: 130 MMOL/L (ref 135–145)
WBC # BLD AUTO: 5.8 K/UL (ref 4.8–10.8)

## 2022-12-27 PROCEDURE — 700111 HCHG RX REV CODE 636 W/ 250 OVERRIDE (IP): Performed by: PHYSICAL MEDICINE & REHABILITATION

## 2022-12-27 PROCEDURE — 36415 COLL VENOUS BLD VENIPUNCTURE: CPT

## 2022-12-27 PROCEDURE — 770010 HCHG ROOM/CARE - REHAB SEMI PRIVAT*

## 2022-12-27 PROCEDURE — 99233 SBSQ HOSP IP/OBS HIGH 50: CPT | Performed by: PHYSICAL MEDICINE & REHABILITATION

## 2022-12-27 PROCEDURE — 97530 THERAPEUTIC ACTIVITIES: CPT

## 2022-12-27 PROCEDURE — 85027 COMPLETE CBC AUTOMATED: CPT

## 2022-12-27 PROCEDURE — 97110 THERAPEUTIC EXERCISES: CPT

## 2022-12-27 PROCEDURE — 700102 HCHG RX REV CODE 250 W/ 637 OVERRIDE(OP): Performed by: PHYSICAL MEDICINE & REHABILITATION

## 2022-12-27 PROCEDURE — A9270 NON-COVERED ITEM OR SERVICE: HCPCS | Performed by: PHYSICAL MEDICINE & REHABILITATION

## 2022-12-27 PROCEDURE — 97535 SELF CARE MNGMENT TRAINING: CPT

## 2022-12-27 PROCEDURE — 80048 BASIC METABOLIC PNL TOTAL CA: CPT

## 2022-12-27 RX ORDER — GABAPENTIN 100 MG/1
200 CAPSULE ORAL 3 TIMES DAILY
Status: DISCONTINUED | OUTPATIENT
Start: 2022-12-27 | End: 2022-12-28 | Stop reason: HOSPADM

## 2022-12-27 RX ORDER — SODIUM CHLORIDE 1 G/1
1 TABLET ORAL
Status: DISCONTINUED | OUTPATIENT
Start: 2022-12-27 | End: 2022-12-28 | Stop reason: HOSPADM

## 2022-12-27 RX ADMIN — SODIUM CHLORIDE 1 G: 1 TABLET ORAL at 11:17

## 2022-12-27 RX ADMIN — SODIUM CHLORIDE 1 G: 1 TABLET ORAL at 16:36

## 2022-12-27 RX ADMIN — OXCARBAZEPINE 900 MG: 300 TABLET, FILM COATED ORAL at 08:09

## 2022-12-27 RX ADMIN — GABAPENTIN 200 MG: 100 CAPSULE ORAL at 19:52

## 2022-12-27 RX ADMIN — GABAPENTIN 200 MG: 100 CAPSULE ORAL at 15:12

## 2022-12-27 RX ADMIN — Medication 1000 UNITS: at 08:09

## 2022-12-27 RX ADMIN — OMEPRAZOLE 20 MG: 20 CAPSULE, DELAYED RELEASE ORAL at 08:09

## 2022-12-27 RX ADMIN — CYCLOBENZAPRINE 10 MG: 10 TABLET, FILM COATED ORAL at 15:11

## 2022-12-27 RX ADMIN — OXYCODONE 2.5 MG: 5 TABLET ORAL at 02:38

## 2022-12-27 RX ADMIN — ARIPIPRAZOLE 10 MG: 10 TABLET ORAL at 08:09

## 2022-12-27 RX ADMIN — GABAPENTIN 300 MG: 300 CAPSULE ORAL at 08:09

## 2022-12-27 RX ADMIN — CYCLOBENZAPRINE 10 MG: 10 TABLET, FILM COATED ORAL at 19:52

## 2022-12-27 RX ADMIN — ENOXAPARIN SODIUM 40 MG: 40 INJECTION SUBCUTANEOUS at 19:52

## 2022-12-27 RX ADMIN — BENZTROPINE MESYLATE 2 MG: 1 TABLET ORAL at 19:52

## 2022-12-27 RX ADMIN — CITALOPRAM HYDROBROMIDE 40 MG: 20 TABLET ORAL at 08:09

## 2022-12-27 RX ADMIN — SENNOSIDES AND DOCUSATE SODIUM 2 TABLET: 50; 8.6 TABLET ORAL at 08:09

## 2022-12-27 RX ADMIN — TRAZODONE HYDROCHLORIDE 50 MG: 50 TABLET ORAL at 19:52

## 2022-12-27 RX ADMIN — CYCLOBENZAPRINE 10 MG: 10 TABLET, FILM COATED ORAL at 08:09

## 2022-12-27 ASSESSMENT — ACTIVITIES OF DAILY LIVING (ADL)
BED_CHAIR_WHEELCHAIR_TRANSFER_DESCRIPTION: ADAPTIVE EQUIPMENT;INCREASED TIME
TOILET_TRANSFER_DESCRIPTION: GRAB BAR;INCREASED TIME
TUB_SHOWER_TRANSFER_DESCRIPTION: GRAB BAR;SHOWER BENCH;INCREASED TIME
TOILETING_LEVEL_OF_ASSIST_DESCRIPTION: GRAB BAR;INCREASED TIME

## 2022-12-27 NOTE — THERAPY
Occupational Therapy  Daily Treatment     Patient Name: Cheng Foster  Age:  32 y.o., Sex:  male  Medical Record #: [id number]  Today's Date: 12/27/2022     Precautions  Precautions: (P) Non Weight Bearing Left Lower Extremity  Comments: (P) hx CP w/ chronic L hemiparesis and spasticity, NWB x6 weeks (starting 12/15), seizure prec         Subjective    Pt agreeable to OT session.      Objective       12/27/22 1301   OT Charge Group   OT Therapy Activity (Units) 2   OT Total Time Spent   OT Individual Total Time Spent (Mins) 30   Precautions   Precautions Non Weight Bearing Left Lower Extremity   Comments hx CP w/ chronic L hemiparesis and spasticity, NWB x6 weeks (starting 12/15), seizure prec   Cognition    Level of Consciousness Alert   Interdisciplinary Plan of Care Collaboration   IDT Collaboration with  Physical Therapist   Patient Position at End of Therapy Seated;Self Releasing Lap Belt Applied;Other (Comments)  (self propel back to room)   Collaboration Comments mod I in room       Pt stood x25 minutes during leisure activity using platform walker, no LOB or instability.     Educated pt on home safety and fall prevention.     Assessment    Pt con't to progress with standing tolerance and balance. He has met all OT goals at this time.   Strengths: Able to follow instructions, Alert and oriented, Effective communication skills, Independent prior level of function, Motivated for self care and independence, Pleasant and cooperative, Supportive family, Willingly participates in therapeutic activities  Barriers: Decreased endurance, Fatigue, Home accessibility, Impaired activity tolerance, Impaired balance, Pain, Spasticity (NWB LLE)    Plan    ADLs, IADLs, balance, platform walker.      Passport items to be completed:  adapt home for safety needs, demonstrate home exercise program, complete caregiver training     Occupational Therapy Goals (Active)       Problem: Functional Transfers       Dates: Start:  12/21/22         Goal: STG-Within one week, patient will transfer to tub/shower       Dates: Start: 12/21/22   Expected End: 12/30/22       Description: (Simulate tub/shower set up) using AD/DME as needed.      Goal Note filed on 12/22/22 1151 by Tana Caceres, OT       To be addressed.                  Problem: OT Long Term Goals       Dates: Start: 12/21/22         Goal: LTG-By discharge, patient will complete basic self care tasks at supv to mod I level using AE as needed.        Dates: Start: 12/21/22   Expected End: 12/30/22            Goal: LTG-By discharge, patient will perform bathroom transfers at supv to mod I level using AD/DME as needed.        Dates: Start: 12/21/22   Expected End: 12/30/22               Problem: Toileting       Dates: Start: 12/21/22         Goal: STG-Within one week, patient will complete toileting tasks with CGA-SBA using AE as needed.        Dates: Start: 12/21/22   Expected End: 12/30/22         Goal Note filed on 12/22/22 1151 by Tana Caceres, OT       Min-mod A

## 2022-12-27 NOTE — THERAPY
Occupational Therapy  Daily Treatment     Patient Name: Cheng Foster  Age:  32 y.o., Sex:  male  Medical Record #: [id number]  Today's Date: 12/27/2022     Precautions  Precautions: (P) Fall Risk, Non Weight Bearing Left Lower Extremity  Comments: (P) hx CP w/ chronic L hemiparesis and spasticity, NWB x6 weeks (starting 12/15), seizure prec         Subjective    Pt resting in bed, agreeable to OT session.      Objective       12/27/22 0701   OT Charge Group   OT Self Care / ADL (Units) 4   OT Total Time Spent   OT Individual Total Time Spent (Mins) 60   Precautions   Precautions Fall Risk;Non Weight Bearing Left Lower Extremity   Comments hx CP w/ chronic L hemiparesis and spasticity, NWB x6 weeks (starting 12/15), seizure prec   Functional Level of Assist   Eating Independent   Grooming Modified Independent;Seated   Bathing Modified Independent   Bathing Description Grab bar;Hand held shower;Tub bench;Increased time   Upper Body Dressing Modified Independent   Upper Body Dressing Description Increased time  (retrieved clothing w/c level, mod I to don/doff pullover shirt)   Lower Body Dressing Supervision   Lower Body Dressing Description Increased time;Supervision for safety;Reacher;Dressing stick  (supv to don/doff boxers, pants and socks)   Toileting Modified Independent   Toileting Description Grab bar;Increased time  (standing at toilet to void)   Bed, Chair, Wheelchair Transfer Modified Independent   Bed Chair Wheelchair Transfer Description Adaptive equipment;Increased time   Toilet Transfers Modified Independent   Toilet Transfer Description Grab bar;Increased time  (w/c<>toilet SPT)   Tub / Shower Transfers Modified Independent   Tub Shower Transfer Description Grab bar;Shower bench;Increased time  (w/c<>shower bench SPT)   Bed Mobility    Supine to Sit Modified Independent   Scooting Modified Independent   Rolling Modified Independent   Interdisciplinary Plan of Care Collaboration   Patient Position at  End of Therapy Seated;Self Releasing Lap Belt Applied;Call Light within Reach;Tray Table within Reach;Phone within Reach       Reviewed pt's Passport to [place], progress with ADLs and bathroom transfers, d/c planning.     Assessment    Pt tolerated OT session well and completed ADLs at supv to mod I level. He maintained NWB LLE prec and had no LOB throughout. Pt verbalized that he feels ready to d/c home, has family support, and OT observed no safety concerns during session.   Strengths: Able to follow instructions, Alert and oriented, Effective communication skills, Independent prior level of function, Motivated for self care and independence, Pleasant and cooperative, Supportive family, Willingly participates in therapeutic activities  Barriers: Decreased endurance, Fatigue, Home accessibility, Impaired activity tolerance, Impaired balance, Pain, Spasticity (NWB LLE)    Plan    ADLs, IADLs, balance, platform walker.      Passport items to be completed:  adapt home for safety needs, demonstrate home exercise program, complete caregiver training     Occupational Therapy Goals (Active)       Problem: Functional Transfers       Dates: Start: 12/21/22         Goal: STG-Within one week, patient will transfer to tub/shower       Dates: Start: 12/21/22   Expected End: 12/30/22       Description: (Simulate tub/shower set up) using AD/DME as needed.      Goal Note filed on 12/22/22 1151 by Tana Caceres OT       To be addressed.                  Problem: OT Long Term Goals       Dates: Start: 12/21/22         Goal: LTG-By discharge, patient will complete basic self care tasks at supv to mod I level using AE as needed.        Dates: Start: 12/21/22   Expected End: 12/30/22            Goal: LTG-By discharge, patient will perform bathroom transfers at supv to mod I level using AD/DME as needed.        Dates: Start: 12/21/22   Expected End: 12/30/22               Problem: Toileting       Dates: Start: 12/21/22          Goal: STG-Within one week, patient will complete toileting tasks with CGA-SBA using AE as needed.        Dates: Start: 12/21/22   Expected End: 12/30/22         Goal Note filed on 12/22/22 1151 by Tana Caceres OT       Min-mod A

## 2022-12-27 NOTE — THERAPY
Physical Therapy   Daily Treatment     Patient Name: Cheng Foster  Age:  32 y.o., Sex:  male  Medical Record #: [id number]  Today's Date: 12/27/2022     Precautions  Precautions: Fall Risk, Non Weight Bearing Left Lower Extremity  Comments: hx CP w/ chronic L hemiparesis and spasticity, NWB x6 weeks (starting 12/15), seizure prec    Subjective    Patient is found up in chair, has ambulated in gym with therapy tech with contact guard assist for safety, is now playing [employer] with tech. Tech and patient report no postural instability with ambulation, was able to go 100+ ft between rest breaks. Patient denies pain with hopping.     Objective       12/27/22 0931   PT Charge Group   PT Therapeutic Exercise (Units) 4   PT Therapeutic Activities (Units) 2   PT Total Time Spent   PT Individual Total Time Spent (Mins) 90   Supine Lower Body Exercise   Hip Abduction Hook Lying;3 sets of 10;Bilateral   Straight Leg Raises Front;3 sets of 10;Bilateral   Short Arc Quad 3 sets of 10;Bilateral   Heel Slide 3 sets of 10;Bilateral   Gluteal Isometrics 3 sets of 10;Bilateral   Other Exercises RLE single leg bridge 2 sets of 10   Bed Mobility    Supine to Sit Modified Independent   Sit to Supine Modified Independent   Sit to Stand Supervised   Scooting Modified Independent   Rolling Modified Independent   Interdisciplinary Plan of Care Collaboration   IDT Collaboration with  Nursing;Physician;Occupational Therapist   Patient Position at End of Therapy Seated;Self Releasing Lap Belt Applied;Phone within Reach     Reviewed Passport to [place], spoke about home set up with bed. Patient was previously on a box spring and mattress resting on the floor. His father dropped off a bed frame to patient's mother, mother has not had time to assemble the bed and frame. Also discussed car transfers, fall recovery, ambulating on carpet, transfers. Patient demonstrates good recall of mat table exercises and safety with transfers.      Assessment    Patient is safe to be mod I in the room to wheelchair for toileting. Demonstrates good recall of hip precautions, supine mat table exercises. Is ambulating farther and farther between rest breaks with increasing/ improving stability. He is meeting his goals and is safe to return to home with family when medically cleared. His family has plans/ support in place to assist him with the steps to enter. Family is not planning on building a ramp in the short term.    Strengths: Able to follow instructions, Independent prior level of function, Motivated for self care and independence, Pleasant and cooperative, Supportive family, Willingly participates in therapeutic activities  Barriers: Impaired activity tolerance, Impaired balance, Pain, Spasticity, Generalized weakness    Plan    Ambulate with platform walker, strengthening on mat table as able    Passport items completed:  Get in/out of bed safely, in/out of a vehicle, safely use mobility device, walk or wheel around home/community, navigate up and down stairs, verbalized how to get up/down from the ground including keeping cell phone on him in the event of ground level fall, ensure home is accessible, demonstrate HEP    Physical Therapy Problems (Active)       Problem: Balance       Dates: Start: 12/21/22         Goal: STG-Within one week, patient will maintain static standing on carpet/ foam with platform walker x 2 minutes       Dates: Start: 12/21/22   Expected End: 12/30/22               Problem: Mobility       Dates: Start: 12/21/22         Goal: STG-Within one week, patient will ambulate 50 ft with platform walker and contact guard assist       Dates: Start: 12/21/22   Expected End: 12/30/22            Goal: STG-Within one week, patient will ambulate up/down a curb with platform walker and contact guard assist       Dates: Start: 12/21/22   Expected End: 12/30/22               Problem: Mobility Transfers       Dates: Start: 12/21/22          Goal: STG-Within one week, patient will transfer bed to chair with julio walker and supervision assist       Dates: Start: 12/21/22   Expected End: 12/30/22               Problem: PT-Long Term Goals       Dates: Start: 12/21/22         Goal: LTG-By discharge, patient will ambulate 75 ft with platform walker and supervision assist       Dates: Start: 12/21/22   Expected End: 12/30/22            Goal: LTG-By discharge, patient will perform home exercise program from handouts with minimal cuing       Dates: Start: 12/21/22   Expected End: 12/30/22            Goal: LTG-By discharge, patient will transfer in/out of a car with supervision assist and LRAD       Dates: Start: 12/21/22   Expected End: 12/30/22

## 2022-12-27 NOTE — CARE PLAN
Problem: Pain - Standard  Goal: Alleviation of pain or a reduction in pain to the patient’s comfort goal  Flowsheets (Taken 12/27/2022 0800)  Non Verbal Scale:   Calm   Unlabored Breathing  Pain Rating Scale (NPRS): 0     Problem: Fall Risk - Rehab  Goal: Patient will remain free from falls  Note: Patient is mod I this shift per physical therapies approval.    The patient is Stable - Low risk of patient condition declining or worsening

## 2022-12-27 NOTE — CARE PLAN
"The patient is Stable - Low risk of patient condition declining or worsening    Shift Goals  Clinical Goals: pain control  Patient Goals: sleep with no pain    Progress made toward(s) clinical / shift goals:      Problem: Bowel Elimination  Goal: Patient will participate in bowel management program  Outcome: Progressing  Note: Continent of bowel and reports having regular BMs     Problem: Bladder / Voiding  Goal: Patient will establish and maintain regular urinary output  Outcome: Progressing  Note: Patient is continent of bladder, voiding via urinal with staff emptying.       Patient is not progressing towards the following goals:    Problem: Fall Risk - Rehab  Goal: Patient will remain free from falls  Outcome: Not Met  Note: Nadiya Moss Fall risk Assessment Score: 11    Moderate fall risk Interventions  - Bed and strip alarm   - Yellow sign by the door   - Yellow wrist band \"Fall risk\"  - Room near to the nurse station  - Do not leave patient unattended in the bathroom  - Fall risk education provided     "

## 2022-12-27 NOTE — PROGRESS NOTES
"Rehab Progress Note     Encounter Date: 12/27/2022    CC: s/p L hip fracture     Interval Events (Subjective)  Vitals reviewed: WNL.  Labs reviewed: 12/27 Na 130.   Patient seen and examined in room. Patient reports feeling well, is asking if he can go home tomorrow.  Reviewed with patient plan to recheck labs tomorrow.  Patient's sodium down to 130, started patient on salt tabs, will recheck labs tomorrow.  Patient agreeable.  Reports his pain is well controlled, will decrease gabapentin dose.       Objective:  Physical Exam:  Vitals: /79   Pulse 76   Temp 36.6 °C (97.8 °F) (Oral)   Resp 16   Ht 1.93 m (6' 4\")   Wt 98.9 kg (218 lb)   SpO2 95%   Gen: NAD, seated comfortably in manual wheelchair   Head:  NC/AT  Eyes/ Nose/ Mouth: PERRLA, moist mucous membranes  Cardio: RRR, good distal perfusion, warm extremities  Pulm: normal respiratory effort, no cyanosis   Abd: Soft NTND, negative borborygmi   Ext: No peripheral edema. No calf tenderness. No clubbing.  Knee immobilizer in place  Neuro: Left upper extremity flexion contracture, left lower extremity plantar flexion contracture    Mental status:  A&Ox4 (person, place, date, situation) answers questions appropriately follows commands  Speech: fluent, no aphasia or dysarthria      Recent Results (from the past 72 hour(s))   CBC WITHOUT DIFFERENTIAL    Collection Time: 12/27/22  6:12 AM   Result Value Ref Range    WBC 5.8 4.8 - 10.8 K/uL    RBC 4.27 (L) 4.70 - 6.10 M/uL    Hemoglobin 13.3 (L) 14.0 - 18.0 g/dL    Hematocrit 39.4 (L) 42.0 - 52.0 %    MCV 92.3 81.4 - 97.8 fL    MCH 31.1 27.0 - 33.0 pg    MCHC 33.8 33.7 - 35.3 g/dL    RDW 39.9 35.9 - 50.0 fL    Platelet Count 332 164 - 446 K/uL    MPV 9.5 9.0 - 12.9 fL   Basic Metabolic Panel    Collection Time: 12/27/22  6:12 AM   Result Value Ref Range    Sodium 130 (L) 135 - 145 mmol/L    Potassium 4.0 3.6 - 5.5 mmol/L    Chloride 96 96 - 112 mmol/L    Co2 27 20 - 33 mmol/L    Glucose 88 65 - 99 mg/dL    Bun " 10 8 - 22 mg/dL    Creatinine 0.72 0.50 - 1.40 mg/dL    Calcium 9.3 8.5 - 10.5 mg/dL    Anion Gap 7.0 7.0 - 16.0   ESTIMATED GFR    Collection Time: 12/27/22  6:12 AM   Result Value Ref Range    GFR (CKD-EPI) 126 >60 mL/min/1.73 m 2       Current Facility-Administered Medications   Medication Frequency    acetaminophen (TYLENOL) tablet 500 mg Q6HRS PRN    vitamin D3 (cholecalciferol) tablet 1,000 Units DAILY    Respiratory Therapy Consult Continuous RT    Pharmacy Consult Request ...Pain Management Review 1 Each PHARMACY TO DOSE    senna-docusate (PERICOLACE or SENOKOT S) 8.6-50 MG per tablet 2 Tablet BID    And    polyethylene glycol/lytes (MIRALAX) PACKET 1 Packet QDAY PRN    And    magnesium hydroxide (MILK OF MAGNESIA) suspension 30 mL QDAY PRN    And    bisacodyl (DULCOLAX) suppository 10 mg QDAY PRN    omeprazole (PRILOSEC) capsule 20 mg DAILY    ondansetron (ZOFRAN ODT) dispertab 4 mg 4X/DAY PRN    Or    ondansetron (ZOFRAN) syringe/vial injection 4 mg 4X/DAY PRN    traZODone (DESYREL) tablet 50 mg QHS PRN    oxyCODONE immediate-release (ROXICODONE) tablet 2.5 mg Q3HRS PRN    Or    oxyCODONE immediate-release (ROXICODONE) tablet 5 mg Q3HRS PRN    enoxaparin (Lovenox) inj 40 mg QHS    cyclobenzaprine (Flexeril) tablet 10 mg TID    benztropine (COGENTIN) tablet 2 mg QHS    ARIPiprazole (Abilify) tablet 10 mg DAILY    citalopram (CeleXA) tablet 40 mg DAILY    OXcarbazepine (TRILEPTAL) tablet 900 mg DAILY    levETIRAcetam (Keppra) injection 500 mg QDAY PRN    gabapentin (NEURONTIN) capsule 300 mg TID       Orders Placed This Encounter   Procedures    Diet Order Diet: Regular     Standing Status:   Standing     Number of Occurrences:   1     Order Specific Question:   Diet:     Answer:   Regular [1]       _____________________________________  Interdisciplinary Team Conference      Discharge Date/Disposition:  12/30/22 home with family with outpatient clinic    _____________________________________      Assessment:  Active Hospital Problems    Diagnosis     *S/p left hip fracture     History of seizures     COVID-19     Muscle spasm of left lower extremity     Cerebral palsy, hemiplegic (HCC)        Medical Decision Making and Plan:  Left hip fracture  - Sustained during ground-level fall with a slip on ice  - Status post ORIF percutaneous pinning by Dr. Becker on 12/15/2022  - NWB LLE x6 weeks  - Monitor left lower extremity rash, improved  - Initiate comprehensive IRF level therapy with 3 hours of therapy 5 days a week with services from PT/OT     History of seizures  - Continuing patient's home dose Trileptal 900 mg daily  - Patient is allergic to Versed, has anaphylaxis response to benzodiazepines, including cardiac arrest  - mom reportsif patient has a seizure they hold the patient until it subsides, they do not last longer than 5 minutes  - mom reports history of focal type seizures including staring off and recovering shortly  - I have discussed patient's allergy to benzodiazepines with pharmacy, we will plan for IV Keppra for seizures that are uncontrollable     COVID-19  - Incidental COVID-19 finding at time of discharge in anticipation for transferring to rehab  - Patient will need to be in isolation x10 days, would be able to be out of isolation on 12/30  - 12/22 COVID retest is negative, removed from isolation     Cerebral palsy, with spasticity  - Was previously on Flexeril, is tolerating well     History of depression  - Continue patient's home dose Abilify, Celexa  - May benefit from rehab psychology     Transaminitis  -Elevated LFTs on admission labs  - changed to scheduled Tylenol to Tylenol 500 mg as needed  - LFTs mildly improving as of 12/22, recheck 12/28    Acute blood loss anemia  - Hemoglobin 12.4 on 12/19, down from 14.7  - 12/27 Hgb 13.3     Hyponatremia  - Sodium 132 postoperatively  - 12/27 sodium 130, started on salt tabs 3 times daily  -  Recheck 12/28    Pain  - 12/21 started gabapentin 300 TID for pain and to lower seizure threshold  - Tylenol as needed, oxycodone as needed     Bowel  - Constipation prevention, scheduled senna   - As needed stool softeners  - Last BM 12/25     GI prophylaxis: Omeprazole     DVT prophylaxis: Lovenox    Total time: 36  minutes.  I spent greater than 50% of the time for patient care and coordination on this date, including unit/floor time, and face-to-face time with the patient as per assessment and plan above including discussing plans for repeat labs.    Kely Villareal D.O.

## 2022-12-28 PROBLEM — U07.1 COVID-19: Status: RESOLVED | Noted: 2022-12-20 | Resolved: 2022-12-28

## 2022-12-28 LAB
ALBUMIN SERPL BCP-MCNC: 4 G/DL (ref 3.2–4.9)
ALBUMIN/GLOB SERPL: 1.3 G/DL
ALP SERPL-CCNC: 124 U/L (ref 30–99)
ALT SERPL-CCNC: 46 U/L (ref 2–50)
ANION GAP SERPL CALC-SCNC: 8 MMOL/L (ref 7–16)
AST SERPL-CCNC: 19 U/L (ref 12–45)
BILIRUB SERPL-MCNC: 0.5 MG/DL (ref 0.1–1.5)
BUN SERPL-MCNC: 10 MG/DL (ref 8–22)
CALCIUM ALBUM COR SERPL-MCNC: 9.2 MG/DL (ref 8.5–10.5)
CALCIUM SERPL-MCNC: 9.2 MG/DL (ref 8.5–10.5)
CHLORIDE SERPL-SCNC: 98 MMOL/L (ref 96–112)
CO2 SERPL-SCNC: 27 MMOL/L (ref 20–33)
CREAT SERPL-MCNC: 0.64 MG/DL (ref 0.5–1.4)
ERYTHROCYTE [DISTWIDTH] IN BLOOD BY AUTOMATED COUNT: 40.5 FL (ref 35.9–50)
GFR SERPLBLD CREATININE-BSD FMLA CKD-EPI: 131 ML/MIN/1.73 M 2
GLOBULIN SER CALC-MCNC: 3 G/DL (ref 1.9–3.5)
GLUCOSE SERPL-MCNC: 83 MG/DL (ref 65–99)
HCT VFR BLD AUTO: 40.3 % (ref 42–52)
HGB BLD-MCNC: 13.7 G/DL (ref 14–18)
MCH RBC QN AUTO: 31.5 PG (ref 27–33)
MCHC RBC AUTO-ENTMCNC: 34 G/DL (ref 33.7–35.3)
MCV RBC AUTO: 92.6 FL (ref 81.4–97.8)
PLATELET # BLD AUTO: 356 K/UL (ref 164–446)
PMV BLD AUTO: 9.5 FL (ref 9–12.9)
POTASSIUM SERPL-SCNC: 3.8 MMOL/L (ref 3.6–5.5)
PROT SERPL-MCNC: 7 G/DL (ref 6–8.2)
RBC # BLD AUTO: 4.35 M/UL (ref 4.7–6.1)
SODIUM SERPL-SCNC: 133 MMOL/L (ref 135–145)
WBC # BLD AUTO: 5.6 K/UL (ref 4.8–10.8)

## 2022-12-28 PROCEDURE — 97110 THERAPEUTIC EXERCISES: CPT

## 2022-12-28 PROCEDURE — 85027 COMPLETE CBC AUTOMATED: CPT

## 2022-12-28 PROCEDURE — 36415 COLL VENOUS BLD VENIPUNCTURE: CPT

## 2022-12-28 PROCEDURE — 700102 HCHG RX REV CODE 250 W/ 637 OVERRIDE(OP): Performed by: PHYSICAL MEDICINE & REHABILITATION

## 2022-12-28 PROCEDURE — 97530 THERAPEUTIC ACTIVITIES: CPT

## 2022-12-28 PROCEDURE — 97535 SELF CARE MNGMENT TRAINING: CPT

## 2022-12-28 PROCEDURE — A9270 NON-COVERED ITEM OR SERVICE: HCPCS | Performed by: PHYSICAL MEDICINE & REHABILITATION

## 2022-12-28 PROCEDURE — 80053 COMPREHEN METABOLIC PANEL: CPT

## 2022-12-28 PROCEDURE — 97116 GAIT TRAINING THERAPY: CPT

## 2022-12-28 PROCEDURE — 99239 HOSP IP/OBS DSCHRG MGMT >30: CPT | Performed by: PHYSICAL MEDICINE & REHABILITATION

## 2022-12-28 RX ORDER — BENZTROPINE MESYLATE 2 MG/1
2 TABLET ORAL
Qty: 30 TABLET | Refills: 0 | Status: SHIPPED | OUTPATIENT
Start: 2022-12-28

## 2022-12-28 RX ORDER — OXCARBAZEPINE 300 MG/1
900 TABLET, FILM COATED ORAL DAILY
Qty: 30 TABLET | Refills: 0 | Status: SHIPPED | OUTPATIENT
Start: 2022-12-29

## 2022-12-28 RX ORDER — OXYCODONE HYDROCHLORIDE 5 MG/1
2.5 TABLET ORAL 2 TIMES DAILY PRN
Qty: 7 TABLET | Refills: 0 | Status: SHIPPED | OUTPATIENT
Start: 2022-12-28 | End: 2023-01-04

## 2022-12-28 RX ORDER — CYCLOBENZAPRINE HCL 10 MG
10 TABLET ORAL 3 TIMES DAILY
Qty: 30 TABLET | Refills: 0 | Status: SHIPPED
Start: 2022-12-28 | End: 2023-03-16

## 2022-12-28 RX ORDER — GABAPENTIN 100 MG/1
200 CAPSULE ORAL 3 TIMES DAILY
Qty: 90 CAPSULE | Refills: 0 | Status: SHIPPED
Start: 2022-12-28 | End: 2023-03-16

## 2022-12-28 RX ADMIN — SODIUM CHLORIDE 1 G: 1 TABLET ORAL at 11:14

## 2022-12-28 RX ADMIN — Medication 1000 UNITS: at 08:12

## 2022-12-28 RX ADMIN — OXYCODONE 5 MG: 5 TABLET ORAL at 00:04

## 2022-12-28 RX ADMIN — OXYCODONE 5 MG: 5 TABLET ORAL at 08:48

## 2022-12-28 RX ADMIN — GABAPENTIN 200 MG: 100 CAPSULE ORAL at 14:23

## 2022-12-28 RX ADMIN — SODIUM CHLORIDE 1 G: 1 TABLET ORAL at 08:12

## 2022-12-28 RX ADMIN — OXCARBAZEPINE 900 MG: 300 TABLET, FILM COATED ORAL at 08:12

## 2022-12-28 RX ADMIN — CITALOPRAM HYDROBROMIDE 40 MG: 20 TABLET ORAL at 08:12

## 2022-12-28 RX ADMIN — OMEPRAZOLE 20 MG: 20 CAPSULE, DELAYED RELEASE ORAL at 08:12

## 2022-12-28 RX ADMIN — GABAPENTIN 200 MG: 100 CAPSULE ORAL at 08:12

## 2022-12-28 RX ADMIN — ARIPIPRAZOLE 10 MG: 10 TABLET ORAL at 08:11

## 2022-12-28 RX ADMIN — CYCLOBENZAPRINE 10 MG: 10 TABLET, FILM COATED ORAL at 08:12

## 2022-12-28 RX ADMIN — CYCLOBENZAPRINE 10 MG: 10 TABLET, FILM COATED ORAL at 14:23

## 2022-12-28 ASSESSMENT — BRIEF INTERVIEW FOR MENTAL STATUS (BIMS)
ASKED TO RECALL BED: YES, NO CUE REQUIRED
BIMS SUMMARY SCORE: 15
ASKED TO RECALL BLUE: YES, NO CUE REQUIRED
INITIAL REPETITION OF BED BLUE SOCK - FIRST ATTEMPT: 3
ASKED TO RECALL SOCK: YES, NO CUE REQUIRED
WHAT MONTH IS IT: ACCURATE WITHIN 5 DAYS
WHAT YEAR IS IT: CORRECT
WHAT DAY OF THE WEEK IS IT: CORRECT

## 2022-12-28 ASSESSMENT — ACTIVITIES OF DAILY LIVING (ADL)
SHOWER_TRANSFER_LEVEL_OF_ASSIST: ABLE TO COMPLETE SHOWER TRANSFER WITHOUT ASSIST
TOILETING_LEVEL_OF_ASSIST: ABLE TO COMPLETE TOILETING WITHOUT ASSIST
TUB_SHOWER_TRANSFER_DESCRIPTION: ADAPTIVE EQUIPMENT;SHOWER BENCH;INCREASED TIME
BED_CHAIR_WHEELCHAIR_TRANSFER_DESCRIPTION: ADAPTIVE EQUIPMENT;INCREASED TIME;SUPERVISION FOR SAFETY;VERBAL CUEING
TOILET_TRANSFER_LEVEL_OF_ASSIST: ABLE TO COMPLETE TOILET TRANSFER WITHOUT ASSIST

## 2022-12-28 ASSESSMENT — PATIENT HEALTH QUESTIONNAIRE - PHQ9
1. LITTLE INTEREST OR PLEASURE IN DOING THINGS: NOT AT ALL
2. FEELING DOWN, DEPRESSED, IRRITABLE, OR HOPELESS: NOT AT ALL
SUM OF ALL RESPONSES TO PHQ9 QUESTIONS 1 AND 2: 0

## 2022-12-28 ASSESSMENT — GAIT ASSESSMENTS
ASSISTIVE DEVICE: FRONT WHEEL WALKER
GAIT LEVEL OF ASSIST: CONTACT GUARD ASSIST
ASSISTIVE DEVICE: FRONT WHEEL WALKER
DISTANCE (FEET): 25
DEVIATION: BRADYKINETIC
DISTANCE (FEET): 25
GAIT LEVEL OF ASSIST: CONTACT GUARD ASSIST

## 2022-12-28 NOTE — THERAPY
Physical Therapy   Daily Treatment     Patient Name: Cheng Foster  Age:  32 y.o., Sex:  male  Medical Record #: [id number]  Today's Date: 12/28/2022     Precautions  Precautions: Non Weight Bearing Left Lower Extremity  Comments: hx CP w/ chronic L hemiparesis and spasticity, NWB x6 weeks (starting 12/15), seizure prec    Subjective    Patient is agreeable to participate, family present for platform walker training, car transfer training     Objective       12/28/22 1301   PT Charge Group   PT Gait Training (Units) 1   PT Therapeutic Activities (Units) 1   PT Total Time Spent   PT Individual Total Time Spent (Mins) 30   Gait Functional Level of Assist    Gait Level Of Assist Contact Guard Assist   Assistive Device Front Wheel Walker   Distance (Feet) 25   Deviation Bradykinetic   Transfer Functional Level of Assist   Bed, Chair, Wheelchair Transfer Modified Independent   Bed Chair Wheelchair Transfer Description Adaptive equipment;Increased time;Supervision for safety;Verbal cueing   Interdisciplinary Plan of Care Collaboration   IDT Collaboration with  Therapy Tech;Family / Caregiver;;Nursing   Patient Position at End of Therapy Family / Friend in Room   Collaboration Comments patient handoff to family, walker adjusted to his height         Assessment    Patient and family demonstrate good carryover/ understanding of platform walker, transfer safety. He is safe to return to home.     Strengths: Able to follow instructions, Independent prior level of function, Motivated for self care and independence, Pleasant and cooperative, Supportive family, Willingly participates in therapeutic activities  Barriers: Impaired activity tolerance, Impaired balance, Pain, Spasticity, Generalized weakness    Plan    D/c irfpai    Passport items completed:  Get in/out of bed safely, in/out of a vehicle, safely use mobility device, walk or wheel around home/community, navigate up and down stairs, show how to get up/down  from the ground, ensure home is accessible, demonstrate HEP, complete caregiver training    Physical Therapy Problems (Active)       Problem: Balance       Dates: Start: 12/21/22         Goal: STG-Within one week, patient will maintain static standing on carpet/ foam with platform walker x 2 minutes       Dates: Start: 12/21/22   Expected End: 12/30/22               Problem: Mobility       Dates: Start: 12/21/22         Goal: STG-Within one week, patient will ambulate 50 ft with platform walker and contact guard assist       Dates: Start: 12/21/22   Expected End: 12/30/22            Goal: STG-Within one week, patient will ambulate up/down a curb with platform walker and contact guard assist       Dates: Start: 12/21/22   Expected End: 12/30/22               Problem: Mobility Transfers       Dates: Start: 12/21/22         Goal: STG-Within one week, patient will transfer bed to chair with julio walker and supervision assist       Dates: Start: 12/21/22   Expected End: 12/30/22               Problem: PT-Long Term Goals       Dates: Start: 12/21/22         Goal: LTG-By discharge, patient will ambulate 75 ft with platform walker and supervision assist       Dates: Start: 12/21/22   Expected End: 12/30/22            Goal: LTG-By discharge, patient will perform home exercise program from handouts with minimal cuing       Dates: Start: 12/21/22   Expected End: 12/30/22            Goal: LTG-By discharge, patient will transfer in/out of a car with supervision assist and LRAD       Dates: Start: 12/21/22   Expected End: 12/30/22

## 2022-12-28 NOTE — CARE PLAN
The patient is Stable - Low risk of patient condition declining or worsening    Shift Goals  Clinical Goals: pain control  Patient Goals: sleep with no pain    Progress made toward(s) clinical / shift goals:    Problem: Fall Risk - Rehab  Goal: Patient will remain free from falls  Outcome: Progressing. Patient bed in lowest locked position with call light within reach. No bed alarm as patient is MOD-I in room with WC. Patient calls for needs if any.      Problem: Bladder / Voiding  Goal: Patient will establish and maintain regular urinary output  Outcome: Progressing. Patient continent of bladder. Uses urinal and bathroom to void. MOD-I with hygiene care.

## 2022-12-28 NOTE — CARE PLAN
Problem: Balance  Goal: STG-Within one week, patient will maintain static standing on carpet/ foam with platform walker x 2 minutes  Outcome: Met     Problem: Mobility  Goal: STG-Within one week, patient will ambulate 50 ft with platform walker and contact guard assist  Outcome: Met  Goal: STG-Within one week, patient will ambulate up/down a curb with platform walker and contact guard assist  Outcome: Met     Problem: Mobility Transfers  Goal: STG-Within one week, patient will transfer bed to chair with julio walker and supervision assist  Outcome: Met     Problem: PT-Long Term Goals  Goal: LTG-By discharge, patient will ambulate 75 ft with platform walker and supervision assist  Outcome: Met  Goal: LTG-By discharge, patient will perform home exercise program from handouts with minimal cuing  Outcome: Met  Goal: LTG-By discharge, patient will transfer in/out of a car with supervision assist and LRAD  Outcome: Met

## 2022-12-28 NOTE — PROGRESS NOTES
Patient discharged to home per order.  Discharge instructions reviewed with patient and MomKena; they verbalize understanding and signed copies placed in chart.  Patient has all belongings; signed copy of form in chart.  Patient left facility at 1445 via w/c accompanied by rehab staff and MomKena.  Have enjoyed working with this pleasant patient.

## 2022-12-28 NOTE — THERAPY
Physical Therapy   Daily Treatment     Patient Name: Cheng Foster  Age:  32 y.o., Sex:  male  Medical Record #: [id number]  Today's Date: 12/28/2022     Precautions  Precautions: Non Weight Bearing Left Lower Extremity  Comments: hx CP w/ chronic L hemiparesis and spasticity, NWB x6 weeks (starting 12/15), seizure prec    Subjective    Pt resting in bed, willing to participate, reports he may d/c today and is pleased     Objective       12/28/22 0831   PT Charge Group   PT Therapeutic Exercise (Units) 2   PT Total Time Spent   PT Individual Total Time Spent (Mins) 30   Gait Functional Level of Assist    Gait Level Of Assist Contact Guard Assist   Assistive Device Front Wheel Walker   Distance (Feet) 25   # of Times Distance was Traveled 2   Deviation   (NWB LLE)   Supine Lower Body Exercise   Bridges Two Legged;3 sets of 10  (LE's propped on bolster wedge to maintain NWB LLE)   Hip Abduction Hook Lying;3 sets of 10   Short Arc Quad 3 sets of 10   Heel Slide 3 sets of 10   Gluteal Isometrics 3 sets of 10   Quadriceps Isometrics 3 sets of 10;Left   Comments AAROM for LLE heel slides   Bed Mobility    Supine to Sit Modified Independent   Sit to Supine Modified Independent   Sit to Stand Supervised   Scooting Modified Independent   Rolling Modified Independent         Assessment    Pt tolerated supine ROM and strength training well despite reports of L hip pain. Pt demonstrates modified independence from  level for functional mobility but recommend SBA/ CGA for upright activity with PFWW due to NWB and pre-existing CP spasticity LLE.  Strengths: Able to follow instructions, Independent prior level of function, Motivated for self care and independence, Pleasant and cooperative, Supportive family, Willingly participates in therapeutic activities  Barriers: Impaired activity tolerance, Impaired balance, Pain, Spasticity, Generalized weakness    Plan    Complete d/c [id number]      Physical Therapy Problems (Active)        Problem: Balance       Dates: Start: 12/21/22         Goal: STG-Within one week, patient will maintain static standing on carpet/ foam with platform walker x 2 minutes       Dates: Start: 12/21/22   Expected End: 12/30/22               Problem: Mobility       Dates: Start: 12/21/22         Goal: STG-Within one week, patient will ambulate 50 ft with platform walker and contact guard assist       Dates: Start: 12/21/22   Expected End: 12/30/22            Goal: STG-Within one week, patient will ambulate up/down a curb with platform walker and contact guard assist       Dates: Start: 12/21/22   Expected End: 12/30/22               Problem: Mobility Transfers       Dates: Start: 12/21/22         Goal: STG-Within one week, patient will transfer bed to chair with julio walker and supervision assist       Dates: Start: 12/21/22   Expected End: 12/30/22               Problem: PT-Long Term Goals       Dates: Start: 12/21/22         Goal: LTG-By discharge, patient will ambulate 75 ft with platform walker and supervision assist       Dates: Start: 12/21/22   Expected End: 12/30/22            Goal: LTG-By discharge, patient will perform home exercise program from handouts with minimal cuing       Dates: Start: 12/21/22   Expected End: 12/30/22            Goal: LTG-By discharge, patient will transfer in/out of a car with supervision assist and LRAD       Dates: Start: 12/21/22   Expected End: 12/30/22

## 2022-12-28 NOTE — CARE PLAN
Problem: Toileting  Goal: STG-Within one week, patient will complete toileting tasks with CGA-SBA using AE as needed.   Outcome: Met     Problem: Functional Transfers  Goal: STG-Within one week, patient will transfer to tub/shower  Description: (Simulate tub/shower set up) using AD/DME as needed.  Outcome: Met     Problem: OT Long Term Goals  Goal: LTG-By discharge, patient will complete basic self care tasks at supv to mod I level using AE as needed.   Outcome: Met  Goal: LTG-By discharge, patient will perform bathroom transfers at supv to mod I level using AD/DME as needed.   Outcome: Met

## 2022-12-28 NOTE — DISCHARGE INSTRUCTIONS
[facility] NURSING DISCHARGE INSTRUCTIONS    Blood Pressure: 129/73  Weight: 98.9 kg (218 lb)  Nursing recommendations for Cheng Foster at time of discharge are as follows:  Client verbalized understanding of all discharge instructions and prescriptions.     Review all your home medications and newly ordered medications with your doctor and/or pharmacist. Follow medication instructions as directed by your doctor and/or pharmacist.    Pain Management:   Discharge Pain Medication Instructions:  Comfort Goal: Comfort with Movement, Perform Activity  Notify your primary care provider if pain is unrelieved with these measures, if the pain is new, or increased in intensity.    Discharge Skin Characteristics: Warm, Dry  Discharge Skin Exam: Other (Comments) (healing hip incision)  Wound 12/20/22 Incision Thigh Lateral Left (Active)   Wound Image   12/25/22 1700   Site Assessment Clean;Dry;Intact 12/28/22 0705   Periwound Assessment Clean;Dry;Intact 12/28/22 0705   Margins Attached edges 12/28/22 0705   Closure Approximated;Staples 12/28/22 0705   Drainage Amount None 12/28/22 0705   Treatments Site care 12/25/22 0900   Wound Cleansing Soap and Water 12/23/22 0825   Periwound Protectant Not Applicable 12/23/22 0825   Dressing Cleansing/Solutions Not Applicable 12/23/22 0825   Dressing Options Open to Air 12/28/22 0705   Dressing Changed Observed 12/27/22 2100   Dressing Status Open to Air 12/27/22 2100   Wound Odor None 12/20/22 2130     Skin / Wound Care Instructions: Please contact your primary care physician for any change in skin integrity. Healing hip incision    If You Have Surgical Incisions / Wounds:  Monitor surgical site(s) for signs of increased swelling, redness or symptoms of drainage from the site or fever as this could indicate signs and symptoms of infection. If these symptoms are noted, notifiy your primary care provider.      Discharge Safety Instructions: Should Not Be Left  Alone In The House     Discharge Safety Concerns:    The interdisciplinary team has made recommendation that you should have adult supervision in the house due to history of falls, weakness, and unsteady gait  Anti-embolic stockings are not required to increase circulation to the lower extremities.    Discharge Diet: Regular     Discharge Liquids: Thin  Discharge Bowel Function: Continent  Please contact your primary care physician for any changes in bowel habits.  Discharge Bowel Program:    Discharge Bladder Function: Continent  Discharge Urinary Devices: None      Nursing Discharge Plan:        Case Management Discharge Instructions:   Discharge Location: Home  Agency Name/Address/Phone:    Home Health:    Outpatient Services:    DME Provider/Phone:    Medical Equipment Ordered:    Prescription Faxed to:        Discharge Medication Instructions:  Below are the medications your physician expects you to take upon discharge:    Fall Prevention in the Home, Adult  Falls can cause injuries. They can happen to people of all ages. There are many things you can do to make your home safe and to help prevent falls. Ask for help when making these changes, if needed.  What actions can I take to prevent falls?  General Instructions  Use good lighting in all rooms. Replace any light bulbs that burn out.  Turn on the lights when you go into a dark area. Use night-lights.  Keep items that you use often in easy-to-reach places. Lower the shelves around your home if necessary.  Set up your furniture so you have a clear path. Avoid moving your furniture around.  Do not have throw rugs and other things on the floor that can make you trip.  Avoid walking on wet floors.  If any of your floors are uneven, fix them.  Add color or contrast paint or tape to clearly jim and help you see:  Any grab bars or handrails.  First and last steps of stairways.  Where the edge of each step is.  If you use a stepladder:  Make sure that it is fully  opened. Do not climb a closed stepladder.  Make sure that both sides of the stepladder are locked into place.  Ask someone to hold the stepladder for you while you use it.  If there are any pets around you, be aware of where they are.  What can I do in the bathroom?         Keep the floor dry. Clean up any water that spills onto the floor as soon as it happens.  Remove soap buildup in the tub or shower regularly.  Use non-skid mats or decals on the floor of the tub or shower.  Attach bath mats securely with double-sided, non-slip rug tape.  If you need to sit down in the shower, use a plastic, non-slip stool.  Install grab bars by the toilet and in the tub and shower. Do not use towel bars as grab bars.  What can I do in the bedroom?  Make sure that you have a light by your bed that is easy to reach.  Do not use any sheets or blankets that are too big for your bed. They should not hang down onto the floor.  Have a firm chair that has side arms. You can use this for support while you get dressed.  What can I do in the kitchen?  Clean up any spills right away.  If you need to reach something above you, use a strong step stool that has a grab bar.  Keep electrical cords out of the way.  Do not use floor polish or wax that makes floors slippery. If you must use wax, use non-skid floor wax.  What can I do with my stairs?  Do not leave any items on the stairs.  Make sure that you have a light switch at the top of the stairs and the bottom of the stairs. If you do not have them, ask someone to add them for you.  Make sure that there are handrails on both sides of the stairs, and use them. Fix handrails that are broken or loose. Make sure that handrails are as long as the stairways.  Install non-slip stair treads on all stairs in your home.  Avoid having throw rugs at the top or bottom of the stairs. If you do have throw rugs, attach them to the floor with carpet tape.  Choose a carpet that does not hide the edge of the  steps on the stairway.  Check any carpeting to make sure that it is firmly attached to the stairs. Fix any carpet that is loose or worn.  What can I do on the outside of my home?  Use bright outdoor lighting.  Regularly fix the edges of walkways and driveways and fix any cracks.  Remove anything that might make you trip as you walk through a door, such as a raised step or threshold.  Trim any bushes or trees on the path to your home.  Regularly check to see if handrails are loose or broken. Make sure that both sides of any steps have handrails.  Install guardrails along the edges of any raised decks and porches.  Clear walking paths of anything that might make someone trip, such as tools or rocks.  Have any leaves, snow, or ice cleared regularly.  Use sand or salt on walking paths during winter.  Clean up any spills in your garage right away. This includes grease or oil spills.  What other actions can I take?  Wear shoes that:  Have a low heel. Do not wear high heels.  Have rubber bottoms.  Are comfortable and fit you well.  Are closed at the toe. Do not wear open-toe sandals.  Use tools that help you move around (mobility aids) if they are needed. These include:  Canes.  Walkers.  Scooters.  Crutches.  Review your medicines with your doctor. Some medicines can make you feel dizzy. This can increase your chance of falling.  Ask your doctor what other things you can do to help prevent falls.  Where to find more information  Centers for Disease Control and PreventionSERINA: https://cdc.gov  National [place] [employer] Aging: https://ay0qsbp.mac.nih.gov  Contact a doctor if:  You are afraid of falling at home.  You feel weak, drowsy, or dizzy at home.  You fall at home.  Summary  There are many simple things that you can do to make your home safe and to help prevent falls.  Ways to make your home safe include removing tripping hazards and installing grab bars in the bathroom.  Ask for help when making these changes in your  home.  This information is not intended to replace advice given to you by your health care provider. Make sure you discuss any questions you have with your health care provider.  Document Released: 10/14/2010 Document Revised: 04/09/2020 Document Reviewed: 08/02/2018  [employer] Patient Education © 2020 [employer]    Depression, Adult  Depression is feeling sad, low, down in the dumps, blue, gloomy, or empty. In general, there are two kinds of depression:  Normal sadness or grief. This can happen after something upsetting. It often goes away on its own within 2 weeks. After losing a loved one (bereavement), normal sadness and grief may last longer than two weeks. It usually gets better with time.  Clinical depression. This kind lasts longer than normal sadness or grief. It keeps you from doing the things you normally do in life. It is often hard to function at home, work, or at school. It may affect your relationships with others. Treatment is often needed.  GET HELP RIGHT AWAY IF:  You have thoughts about hurting yourself or others.  You lose touch with reality (psychotic symptoms). You may:  See or hear things that are not real.  Have untrue beliefs about your life or people around you.  Your medicine is giving you problems.  MAKE SURE YOU:  Understand these instructions.  Will watch your condition.  Will get help right away if you are not doing well or get worse.     This information is not intended to replace advice given to you by your health care provider. Make sure you discuss any questions you have with your health care provider.     Document Released: 01/20/2012 Document Revised: 01/08/2016 Document Reviewed: 04/18/2013  [employer] Interactive Patient Education ©2016 [employer].    Pain Medicine Instructions  You may need pain medicine after an injury or illness. Two common types of pain medicine are:  Opioid pain medicine. These may be called opioids.  Non-opioid pain medicine. This includes NSAIDs.  It  is important to follow your doctor's instructions when you are taking pain medicine. Doing this can keep yourself and others safe.  How can pain medicine affect me?  Pain medicine may not make all of your pain go away. It should make you comfortable enough to:  Move.  Breathe.  Do normal activities.  Opioids can cause side effects, such as:  Trouble pooping (constipation).  Feeling sick to your stomach (nausea).  Throwing up (vomiting).  Feeling very sleepy.  Confusion.  Taking the medicine for nonmedical reasons even though taking it hurts your health and well-being (opioid use disorder).  Trouble breathing (respiratory depression).  Taking opioids for longer than 3 days raises your risk of these side effects.  Taking opioids for a long time can affect how well you can do daily tasks. Taking them for a long time also puts you at risk for:  Car crashes.  Depression.  Suicide.  Heart attack.  Taking too much of the medicine (overdose). This can lead to death.  What should I do to stay safe while taking pain medicine?  Take your medicine as told  Take pain medicine exactly as told by your doctor. Take it only when you need it.  Write down the times when you take your pain medicine. Look at the times before you take your next dose.  Take other over-the-counter or prescription medicines only as told by your doctor.  If your pain medicine has acetaminophen in it, do not take any other acetaminophen while you are taking this medicine. Too much can damage the liver.  Get pain medicine prescriptions from only one doctor.  Avoid certain activities  While you are taking prescription pain medicine, and for 8 hours after your last dose:  Do not drive.  Do not use machinery.  Do not use power tools.  Do not sign legal documents.  Do not drink alcohol.  Do not take sleeping pills.  Do not take care of children by yourself.  Do not do any activities that involve climbing or being in high places.  Do not go into any body of water  unless there is an adult nearby who can watch you and help you if needed. This includes:  Lakes.  Rivers.  Oceans.  Spas.  Swimming pools.    Keep others safe  Store your medicine as told by your doctor. Keep it where children and pets cannot reach it.  Do not share your pain medicine with anyone.  Do not save any leftover pills. If you have leftover pills, you can:  Bring them to a take-back program.  Bring them to a pharmacy that has a drug disposal container.  Throw them in the trash. Check the medicine label or package insert to see if it is safe to throw it out. If it is safe, take the medicine out of the container. Mix it with something that makes it unusable, such as pet waste. Then put the medicine in the trash.  General instructions  Talk with your doctor about other ways to manage your pain.  If you have trouble pooping:  Drink enough fluid to keep your pee (urine) pale yellow.  Use a poop (stool) softener as told by your doctor.  Eat more fruits and vegetables.  Keep all follow-up visits as told by your doctor. This is important.  Contact a doctor if:  Your medicine is not helping with your pain.  You have a rash.  You feel depressed.  Get help right away if:  Seek medical care right away if you are taking pain medicines and you (or people close to you) notice any of the following:  Trouble breathing.  Breathing that is shorter than normal.  Breathing that is more shallow than normal.  Confusion.  Sleepiness.  Trouble staying awake.  Feeling sick to your stomach.  Throwing up.  Your skin or lips turning pale or bluish in color.  Tongue swelling.  If you ever feel like you may hurt yourself or others, or have thoughts about taking your own life, get help right away. Go to your nearest emergency department or call:  Your local emergency services (911 in the [place]  A suicide crisis helpline, such as the National Suicide Prevention Lifeline at 1-594.277.5999. This is open 24 hours a day.  Summary  Take your  pain medicine exactly as told by your doctor.  Pain medicine can help lower your pain. It may also cause side effects.  Talk with your doctor about other ways to manage your pain.  Follow your doctor's instructions about how to take your pain medicine and keep others safe. Ask what activities you should avoid while taking pain medicine.  This information is not intended to replace advice given to you by your health care provider. Make sure you discuss any questions you have with your health care provider.  Document Released: 06/05/2009 Document Revised: 11/30/2018 Document Reviewed: 07/30/2018  [employer] Patient Education © 2020 [employer]    Hip Fracture Treated With ORIF, Care After  This sheet gives you information about how to care for yourself after your procedure. Your health care provider may also give you more specific instructions. If you have problems or questions, contact your health care provider.  What can I expect after the procedure?  After the procedure, it is common to have:  Pain. You will be given medicines to treat this.  Swelling.  Difficulty walking.  Some redness or bruising around the incision.  A small amount of fluid or blood from the incision.  Follow these instructions at home:  Medicines  Take over-the-counter and prescription medicines only as told by your health care provider.  You may be given a blood thinner to take for up to six weeks. This will help reduce the risk of developing a blood clot. It is important to use this medicine exactly as directed.  You may be given calcium and vitamin D supplements to strengthen your bones.  If you are taking prescription pain medicine, take actions to prevent or treat constipation. Your health care provider may recommend that you:  Drink enough fluid to keep your urine pale yellow.  Eat foods that are high in fiber, such as fresh fruits and vegetables, whole grains, and beans.  Limit foods that are high in fat and processed sugars, such as  fried or sweet foods.  Take an over-the-counter or prescription medicine for constipation.  Bathing  Do not take baths, swim, or use a hot tub until your health care provider approves. Ask your health care provider if you can take showers. You may only be allowed to take sponge baths.  Keep the bandage (dressing) dry until your health care provider says it can be removed.  Incision care    Follow instructions from your health care provider about how to take care of your incision. Make sure you:  Wash your hands with soap and water before you change your dressing. If soap and water are not available, use hand .  Change your dressing as told by your health care provider.  Leave stitches (sutures), skin glue, or adhesive strips in place. These skin closures may need to stay in place for 2 weeks or longer. If adhesive strip edges start to loosen and curl up, you may trim the loose edges. Do not remove adhesive strips completely unless your health care provider tells you to do that.  Check your incision area every day for signs of infection. Check for:  More redness, swelling, or pain.  More fluid or blood.  Warmth.  Pus or a bad smell.  Managing pain, stiffness, and swelling    If directed, put ice on the affected area to prevent pain and swelling.  Put ice in a plastic bag.  Place a towel between your skin and the bag.  Leave the ice on for 20 minutes, 2-3 times a day.  Move your toes often to avoid stiffness and to lessen swelling.  Raise (elevate) your leg above the level of your heart while you are sitting or lying down. To do this, try putting a few pillows under your leg.  Activity    Return to your normal activities as told by your health care provider. Ask your health care provider what activities are safe for you.  Do exercises as told by your health care provider or physical therapist. This will help make your hip stronger and help you recover more quickly.  Do not use your injured limb to support  (bear) your body weight until your health care provider says that you can. Follow weight-bearing restrictions as told. Use crutches or other devices to help you move around (assistive devices) as directed.  You may feel most comfortable using a raised surface when sitting on the toilet or in a chair.  Consider using a toilet seat riser over the toilet for comfort.  Driving  Do not drive or use heavy machinery while taking prescription pain medicine.  Ask your health care provider when it is safe for you to drive.  General instructions  Wear compression stockings as told by your health care provider. These stockings help to prevent blood clots and reduce swelling in your legs.  Do not use any products that contain nicotine or tobacco, such as cigarettes and e-cigarettes. These can delay bone healing. If you need help quitting, ask your health care provider.  Keep all follow-up visits as told by your health care provider. This is important. This may include visits for:  Physical therapy.  Screening for osteoporosis. Osteoporosis is thinning and loss of density in your bones.  Contact a health care provider if you:  Have a fever.  Have pain that is not helped with medicine.  Have more redness, swelling, or pain at your incision area.  Have more fluid or blood coming from your incision or leaking through your dressing.  Notice that your incision feels warm to the touch.  Have pus or a bad smell coming from your incision area.  Get help right away if you:  Notice that the edges of your incision have come apart after the sutures or staples have been removed.  Have pain, warmth, or tenderness in the back of your lower leg (calf).  Have tingling or numbness in your leg.  Have a pale and cold leg.  Have trouble breathing.  Have chest pain.  Summary  After the procedure, it is common to have some pain and swelling.  Take pain medicines as directed by your health care provider. Icing may also help with pain control.  Contact  your health care provider if you have signs of infection, severe pain, or more fluid or blood coming from your incision.  This information is not intended to replace advice given to you by your health care provider. Make sure you discuss any questions you have with your health care provider.  Document Released: 07/15/2015 Document Revised: 09/07/2019 Document Reviewed: 01/28/2019  [email or web address] Patient Education © 2020 [email or web address]    Occupational Therapy Discharge Instructions for Cheng Foster    12/28/2022    Level of Assist Required for Eating: Able to Complete Eating without Assist  Level of Assist Required for Grooming: Able to Complete Grooming without Assist  Level of Assist Required for Dressing: Able to Complete Dressing without Assist  Level of Assist Required for Toileting: Able to Complete Toileting without Assist  Level of Assist Required for Toilet Transfer: Able to Complete Toilet Transfer without Assist  Equipment for Toilet Transfer: Commode over Toilet  Level of Assist Required for Bathing: Able to Complete Bathing without Assist  Equipment for Bathing: Tub Transfer Bench, Hand Held Shower Head  Level of Assist Required for Shower Transfer: Able to Complete Shower Transfer without Assist  Equipment for Shower Transfer: Tub Transfer Bench  Level of Assist Required for Meal Prep: Requires Supervision with Meal Preparation    It was great working with you, John! We will miss you and wish you all the best!  -Tana OT

## 2022-12-28 NOTE — THERAPY
Occupational Therapy  Daily Treatment     Patient Name: Cheng Foster  Age:  32 y.o., Sex:  male  Medical Record #: [id number]  Today's Date: 12/28/2022     Precautions  Precautions: (P) Non Weight Bearing Left Lower Extremity  Comments: (P) hx CP w/ chronic L hemiparesis and spasticity, NWB x6 weeks (starting 12/15), seizure prec         Subjective    Pt happy that he gets to d/c home this afternoon.      Objective       12/28/22 1001   OT Charge Group   OT Self Care / ADL (Units) 2   OT Therapy Activity (Units) 4   OT Total Time Spent   OT Individual Total Time Spent (Mins) 90   Precautions   Precautions Non Weight Bearing Left Lower Extremity   Comments hx CP w/ chronic L hemiparesis and spasticity, NWB x6 weeks (starting 12/15), seizure prec   Cognition    Level of Consciousness Alert   Functional Level of Assist   Grooming Modified Independent;Standing   Lower Body Dressing Modified Independent   Lower Body Dressing Description Increased time   Tub / Shower Transfers Modified Independent   Tub Shower Transfer Description Adaptive equipment;Shower bench;Increased time  (dry tub/shower txfr using platform walker and tub txfr bench)   IADL Treatments   Home Management pt retrieved and packed belongings from closet, drawers, side table, bathroom from both w/c and platform walker level, mod I.   Bed Mobility    Supine to Sit Modified Independent   Sit to Supine Modified Independent   Scooting Modified Independent   Rolling Modified Independent   Skilled Intervention   (in/out of standard queen size bed, platform walker for approach)   Interdisciplinary Plan of Care Collaboration   IDT Collaboration with  Nursing;Physician;Physical Therapist   Patient Position at End of Therapy Seated;Other (Comments)  (self propel back to room)   Collaboration Comments d/c planning     W/c mobility outdoors over variable surfaces, including sidewalk, ramp, pavement x1500 ft with supv.    Functional ambulation using platform walker  on carpeted surface to simulate home environment at mod I level, no LOB.     Functional ambulation around facility ~200 ft with supv, no LOB.     Pt education re: home safety, fall prevention, energy conservation strategies, benefits of ongoing therapies.       Assessment    Pt tolerated OT session well and able to complete all functional tasks noted above at supv to mod I level. He is tolerating functional mobility/transfers using platform walker well, and had no instability or LOB. Pt will need tub txfr bench and commode over toilet for safety with bathroom txfrs. He will require some intermittent supv/assist with IADLs, unless he is able to complete from w/c level.   Strengths: Able to follow instructions, Alert and oriented, Effective communication skills, Independent prior level of function, Motivated for self care and independence, Pleasant and cooperative, Supportive family, Willingly participates in therapeutic activities  Barriers: Decreased endurance, Fatigue, Home accessibility, Impaired activity tolerance, Impaired balance, Pain, Spasticity (NWB LLE)    Plan    D/c today.     Passport items to be completed:  Completed.     Occupational Therapy Goals (Active)       Problem: Functional Transfers       Dates: Start: 12/21/22         Goal: STG-Within one week, patient will transfer to tub/shower       Dates: Start: 12/21/22   Expected End: 12/30/22       Description: (Simulate tub/shower set up) using AD/DME as needed.      Goal Note filed on 12/22/22 1151 by Tana Caceres OT       To be addressed.                  Problem: OT Long Term Goals       Dates: Start: 12/21/22         Goal: LTG-By discharge, patient will complete basic self care tasks at supv to mod I level using AE as needed.        Dates: Start: 12/21/22   Expected End: 12/30/22            Goal: LTG-By discharge, patient will perform bathroom transfers at supv to mod I level using AD/DME as needed.        Dates: Start: 12/21/22   Expected  End: 12/30/22               Problem: Toileting       Dates: Start: 12/21/22         Goal: STG-Within one week, patient will complete toileting tasks with CGA-SBA using AE as needed.        Dates: Start: 12/21/22   Expected End: 12/30/22         Goal Note filed on 12/22/22 1151 by Tana Caceres, OT       Min-mod A

## 2022-12-28 NOTE — PROGRESS NOTES
This patient , and family with patient permission, received individualized medication counseling regarding the current regimen they are receiving in this facility. Potential adverse effects, monitoring parameters, and proper administration were covered in preparation for their discharge.The patient will likely be taking pain medications upon discharge. The patient was warned regarding sedation, impairment, and constipation as side effects to such regimens. Further warning regarding operating motor vehicles, or dangerous equipment was delivered as well. The patient asked relevant questions regarding the medications for which answers were provided. Our pharmacy hours and phone number were provided for follow up questions should they arise.  Sergio Cazares  [facility]

## 2022-12-28 NOTE — DISCHARGE SUMMARY
Physical Medicine & Rehabilitation Discharge Summary    Admission Date: 12/20/2022    Discharge Date: 12/28/2022    Attending Provider: Kely Villareal DO     Admission Diagnosis:   Active Hospital Problems    Diagnosis     *S/p left hip fracture     History of seizures     Muscle spasm of left lower extremity     Cerebral palsy, hemiplegic (HCC)        Discharge Diagnosis:  Active Hospital Problems    Diagnosis     *S/p left hip fracture     History of seizures     Muscle spasm of left lower extremity     Cerebral palsy, hemiplegic (HCC)        HPI per Admission History & Physical:  The patient is a 32 y.o. male with a past medical history of seizure disorder, CP with chronic left hemiparesis and spasticity; now admitted for acute inpatient rehabilitation with severe functional debility.  Per documentation, patient sustained a ground-level fall after slipping on ice on 12/13.  Upon evaluation in the emergency department patient had left hip pain.  Images obtained in the emergency department showed a left femoral neck fracture.  Orthopedic surgery was consulted, and patient was taken to the OR for an ORIF of the left hip with percutaneous pinning performed by Dr. Becker on 12/15/2022.  Patient is NWB LLE x6 weeks per Ortho recommendations.  Patient's hospital course has been complicated by mild leukocytosis which resolved on 12/19.  Additionally he has hyponatremia and acute blood loss anemia.  Hemoglobin 12.4 on 12/19.  Patient has been willing to participate with therapy he requires the use of a hemiwalker for transfers.  He has been contact-guard assist for transfers for sit to stand.  Gait has not been attempted since patient is NWB LLE.   At time of discharge from [facility], COVID testing was completed.  Unfortunately patient's COVID test has returned positive.  Patient is still a rehab candidate, his rehab will be done in isolation.    Patient was admitted to [facility] on  12/20/2022.     Hospital Course by Problem List:  Left hip fracture  - Sustained during ground-level fall with a slip on ice  - Status post ORIF percutaneous pinning by Dr. Becker on 12/15/2022  - NWB LLE x6 weeks  - staples removed 12/28, 13 days post op   - Monitor left lower extremity rash, improved  - completed IRF level therapy with 3 hours of therapy 5 days a week with services from PT/OT     History of seizures  - Continuing patient's home dose Trileptal 900 mg daily  - Patient is allergic to Versed, has anaphylaxis response to benzodiazepines, including cardiac arrest  - mom reportsif patient has a seizure they hold the patient until it subsides, they do not last longer than 5 minutes  - mom reports history of focal type seizures including staring off and recovering shortly  - I have discussed patient's allergy to benzodiazepines with pharmacy, we will plan for IV Keppra for seizures that are uncontrollable     COVID-19  - Incidental COVID-19 finding at time of discharge in anticipation for transferring to rehab  - Patient will need to be in isolation x10 days, would be able to be out of isolation on 12/30  - 12/22 COVID retest is negative, removed from isolation     Cerebral palsy, with spasticity  - Was previously on Flexeril, is tolerating well     History of depression  - Continue patient's home dose Abilify, Celexa  - May benefit from rehab psychology     Transaminitis, resolved   -Elevated LFTs on admission labs  - changed to scheduled Tylenol to Tylenol 500 mg as needed  - LFTs mildly improving as of 12/22, resolved on 12/28      Acute blood loss anemia  - Hemoglobin 12.4 on 12/19, down from 14.7  - 12/28  Hgb 13.7      Hyponatremia  - Sodium 132 postoperatively  - 12/27 sodium 130, started on salt tabs 3 times daily  - Recheck 12/28 - > improved to 133      Pain  - decreased gabapentin dose, pain well controlled   - Tylenol as needed, oxycodone as needed, not using      Bowel  - Constipation  prevention, scheduled senna   - As needed stool softeners  - Last BM 12/25     GI prophylaxis: Omeprazole     DVT prophylaxis: Lovenox    Functional Status at Discharge  Eating:  Independent  Eating Description:  Set-up of equipment or meal/tube feeding  Grooming:  Modified Independent, Seated  Grooming Description:  Increased time, Seated in wheelchair at sink (pt completed shaving and oral care seated at sink)  Bathing:  Modified Independent  Bathing Description:  Grab bar, Hand held shower, Tub bench, Increased time  Upper Body Dressing:  Modified Independent  Upper Body Dressing Description:  Increased time (retrieved clothing w/c level, mod I to don/doff pullover shirt)  Lower Body Dressing:  Supervision  Lower Body Dressing Description:  Increased time, Supervision for safety, Reacher, Dressing stick (supv to don/doff boxers, pants and socks)     Walk:  Contact Guard Assist  Distance Walked:  30  Number of Times Distance Was Traveled:  2  Assistive Device:  Platform Walker (L platform walker)  Gait Deviation:  Decreased Base Of Support (intermittent postural wobbles, is able to recovery by slowing his speed and leaning onto FWW)  Wheelchair:  Modified Independent  Distance Propelled:  400   Wheelchair Description:  Extra time, Supervision for safety, Verbal cueing (uneven and on ramps)  Stairs    Stairs Description       Comprehension:  Independent  Comprehension Description:     Expression:  Independent  Expression Description:     Social Interaction:     Social Interaction Description:     Problem Solving:  Modified Independent  Problem Solving Description:  Increased time  Memory:  Independent  Memory Description:          Kely ARAUJO D.O., personally performed a complete drug regimen review and no potential clinically significant medication issues were identified.   Discharge Medication:     Medication List        START taking these medications        Instructions   cyclobenzaprine 10 mg  Tabs  Commonly known as: Flexeril   Take 1 Tablet by mouth 3 times a day.  Dose: 10 mg     gabapentin 100 MG Caps  Commonly known as: NEURONTIN   Take 2 Capsules by mouth 3 times a day.  Dose: 200 mg     oxyCODONE immediate-release 5 MG Tabs  Commonly known as: ROXICODONE   Take 0.5 Tablets by mouth 2 times a day as needed for Severe Pain for up to 7 days.  Dose: 2.5 mg     vitamin D 1000 UNIT Tabs  Start taking on: December 29, 2022  Commonly known as: VITAMIND D3   Take 1 Tablet by mouth every day.  Dose: 1,000 Units            CHANGE how you take these medications        Instructions   benztropine 2 MG Tabs  What changed: when to take this  Commonly known as: COGENTIN   Take 1 Tablet by mouth at bedtime.  Dose: 2 mg     OXcarbazepine 300 MG Tabs  Start taking on: December 29, 2022  What changed:   how much to take  when to take this  Commonly known as: TRILEPTAL   Take 3 Tablets by mouth every day.  Dose: 900 mg            CONTINUE taking these medications        Instructions   Abilify 10 MG Tabs  Generic drug: ARIPiprazole   Take 10 mg by mouth every day.  Dose: 10 mg     citalopram 40 MG Tabs  Commonly known as: CeleXA   Take 40 mg by mouth every day.  Dose: 40 mg            STOP taking these medications      trazodone 300 MG tablet  Commonly known as: DESYREL              Discharge Diet:  Current Diet Order   Procedures    Diet Order Diet: Regular       Discharge Activity:  CGA with FWW x 25 ft x 2      Disposition:  Patient to discharge home with family support and community resources.    Equipment:  Platform walker     Follow-up & Discharge Instructions:  Follow up with your primary care provider (PCP) within 7-10 days of discharge to review your medications and take over your care.   Follow up with ortho     If you develop chest pain, fever, chills, change in neurologic function (weakness, sensation changes, vision changes), or other concerning sxs, seek immediate medical attention or call 911.      No  future appointments.    Condition on Discharge:  Good    More than 36  minutes was spent on discharging this patient, including face-to-face time, prescription management, and the dictation of this note.    Kely Villareal D.O.    Date of Service: 12/28/2022

## 2022-12-28 NOTE — DISCHARGE PLANNING
CM met with patient to discuss DME. He wants to go home today, waiting on doctor to clear him. DME to be delivered today.

## 2022-12-28 NOTE — PROGRESS NOTES
Patient care assumed. Report received from [facility] JUAN FRANCISCO Arreguin. Patient is alert and calm, resting in bed. Call light and bedside table within reach. Will continue to monitor.

## 2023-03-08 ENCOUNTER — APPOINTMENT (OUTPATIENT)
Dept: PHYSICAL MEDICINE AND REHAB | Facility: MEDICAL CENTER | Age: 33
End: 2023-03-08
Payer: MEDICAID

## 2023-03-08 NOTE — PROGRESS NOTES
[facility]  [facility]  [address] [place] [address]  Ph: [phone]    NEW PATIENT EVALUATION    *Patient established in PM&R practice, however, patient new to writer as patient is transferring care. Therefore, patient billed as established.     Patient Name: Cheng Foster   Patient : 1990  Patient Age: 32 y.o.     Examining Physician: Dr. Isabel Medina, DO    PM&R History to Date - Background Information:  Dates of Admission/Discharge to ARU: 2022 - 2022    SUBJECTIVE:   Patient Identification: Cheng Foster is a 32 y.o. male with PMH significant for seizure disorder, CP with chronic left hemiparesis and spasticity and rehabilitation history significant for physical deconditioning and debility secondary to left hip fracture after GLF s/p ORIF Dr. Becker 12/15/2022 and is presenting to PM&R clinic for a NEW OUTPATIENT evaluation with the following chief complaint/s:    Chief Complaint: ARU discharge follow-up    Accompanied by Today: ***  History of Present Illness:    -Records reviewed: Acute rehab discharge summary reviewed in its entirety.      Review of Systems:  All other pertinent positive review of systems are noted above in HPI.   All other systems reviewed and are negative.    Past Medical History:  No past medical history on file.   No past surgical history on file.     Current Outpatient Medications:     benztropine (COGENTIN) 2 MG Tab, Take 1 Tablet by mouth at bedtime., Disp: 30 Tablet, Rfl: 0    cyclobenzaprine (FLEXERIL) 10 mg Tab, Take 1 Tablet by mouth 3 times a day., Disp: 30 Tablet, Rfl: 0    gabapentin (NEURONTIN) 100 MG Cap, Take 2 Capsules by mouth 3 times a day., Disp: 90 Capsule, Rfl: 0    OXcarbazepine (TRILEPTAL) 300 MG Tab, Take 3 Tablets by mouth every day., Disp: 30 Tablet, Rfl: 0    vitamin D3 (VITAMIND D3) 1000 UNIT Tab, Take 1 Tablet by mouth every day., Disp: 30 Tablet, Rfl: 0    aripiprazole  (ABILIFY) 10 MG Tab, Take 10 mg by mouth every day., Disp: , Rfl:     citalopram (CELEXA) 40 MG Tab, Take 40 mg by mouth every day., Disp: , Rfl:   Allergies   Allergen Reactions    Codeine     Versed Anaphylaxis        Past Social History:  Social History     Socioeconomic History    Marital status: Single     Spouse name: Not on file    Number of children: Not on file    Years of education: Not on file    Highest education level: Not on file   Occupational History    Not on file   Tobacco Use    Smoking status: Never    Smokeless tobacco: Not on file   Substance and Sexual Activity    Alcohol use: Not on file    Drug use: Not on file    Sexual activity: Not on file   Other Topics Concern    Not on file   Social History Narrative    Not on file     Social Determinants of Health     Financial Resource Strain: Not on file   Food Insecurity: Not on file   Transportation Needs: No Transportation Needs    Lack of Transportation (Medical): No    Lack of Transportation (Non-Medical): No   Physical Activity: Not on file   Stress: Not on file   Social Connections: Not on file   Intimate Partner Violence: Not on file   Housing Stability: Not on file        Family History:  No family history on file.    Depression and Opioid Screening  PHQ-9:      12/25/2022     9:00 AM 12/27/2022     9:00 PM 12/28/2022     9:02 AM   Depression Screen (PHQ-2/PHQ-9)   PHQ-2 Total Score 0 0 0     Interpretation of PHQ-9 Total Score   Score Severity   1-4 No Depression   5-9 Mild Depression   10-14 Moderate Depression   15-19 Moderately Severe Depression   20-27 Severe Depression     OBJECTIVE:   Vital Signs:  There were no vitals filed for this visit.     Pertinent Labs:  Lab Results   Component Value Date/Time    SODIUM 133 (L) 12/28/2022 05:55 AM    POTASSIUM 3.8 12/28/2022 05:55 AM    CHLORIDE 98 12/28/2022 05:55 AM    CO2 27 12/28/2022 05:55 AM    GLUCOSE 83 12/28/2022 05:55 AM    BUN 10 12/28/2022 05:55 AM    CREATININE 0.64 12/28/2022  05:55 AM    GLOMRATE 119 12/16/2022 04:21 AM       Lab Results   Component Value Date/Time    HBA1C 5.8 (H) 12/21/2022 06:33 AM       Lab Results   Component Value Date/Time    WBC 5.6 12/28/2022 05:55 AM    RBC 4.35 (L) 12/28/2022 05:55 AM    HEMOGLOBIN 13.7 (L) 12/28/2022 05:55 AM    HEMATOCRIT 40.3 (L) 12/28/2022 05:55 AM    MCV 92.6 12/28/2022 05:55 AM    MCH 31.5 12/28/2022 05:55 AM    MCHC 34.0 12/28/2022 05:55 AM    MPV 9.5 12/28/2022 05:55 AM    NEUTSPOLYS 58.30 12/21/2022 06:33 AM    LYMPHOCYTES 27.20 12/21/2022 06:33 AM    MONOCYTES 8.40 12/21/2022 06:33 AM    EOSINOPHILS 4.70 12/21/2022 06:33 AM    BASOPHILS 0.60 12/21/2022 06:33 AM       Lab Results   Component Value Date/Time    ASTSGOT 19 12/28/2022 05:55 AM    ALTSGPT 46 12/28/2022 05:55 AM        Physical Exam:   GEN: No apparent distress  HEENT: Head normocephalic, atraumatic.  Sclera nonicteric bilaterally, no ocular discharge appreciated bilaterally.  CV: Extremities warm and well-perfused, no peripheral edema appreciated bilaterally.  PULMONARY: Breathing nonlabored on room air, no respiratory accessory muscle use.  Not requiring supplemental oxygen.  ABD: Soft, nontender.  SKIN: No appreciable skin breakdown on exposed areas of skin.  PSYCH: Mood and affect within normal limits.  NEURO: Awake alert.  Conversational.  Logical thought content.    Motor Exam Upper Extremities   ? Myotome R L   Shoulder Abduction C5 5 5   Elbow flexion C5 5 5   Wrist extension C6 5 5   Elbow extension C7 5 5   Finger flexion C8 5 5   Finger abduction T1 5 5     Motor Exam Lower Extremities  ? Myotome R L   Hip flexion L2 5 5   Knee extension L3 5 5   Ankle dorsiflexion L4 5 5   Toe extension L5 5 5   Ankle plantarflexion S1 5 5       Tone on Modified Jeri Scale    R L  R L   Elbow extension (testing tone of elbow flexors) ***   Hip extension (testing hip flexors)     Elbow flexion (testing tone of elbow extensors)   Hip abduction (testing adductors)     Wrist  extension (testing tone of wrist flexors)    Knee extension (testing knee flexors)     Finger extension (testing tone of finger flexors)   Knee flexion (testing knee extensors)     Supination (testing forearm pronators)   Dorsiflexion (testing plantarflexors)        Plantarflexion (testing dorsiflexors)       Montalvo's *** bilaterally.  Babinskis *** bilaterally.  No clonus appreciated at BL ankles.  Sensation *** bilateral extremities.     Imaging:   Left hip x-ray 12/20/2022  FINDINGS: There is a minimally displaced fracture of the left femoral neck. No   additional fracture noted. No dislocation. The joint spaces are well maintained   about the left hip. Pelvis and sacrum are unremarkable. No soft tissue   abnormalities noted.    ASSESSMENT/PLAN: Cheng Foster  is a 32 y.o. male with rehabilitation history significant for hysical deconditioning and debility secondary to left hip fracture after GLF s/p ORIF Dr. Becker 12/15/2022, here for evaluation. The following plan was discussed with the patient who is in agreement.     {No diagnosis found. (Refresh or delete this SmartLink)}     Rehab/Neuro/Ortho:   Physical deconditioning and debility secondary to left hip fracture after GLF s/p ORIF Dr. Becker 12/15/2022  Spastic hemiplegic CP  Left spastic hemiparesis  -Records reviewed as aforementioned in the HPI.    Assessment of Current Functional Status:    Spasticity:     Autonomic Dysreflexia:     Neuropathic Pain:     Neurogenic Bladder:     Neurogenic Bowel:     Endo:     Mood:     Sleep:     GI/Diet:     Skin: Due to neurologic diagnosis of *** and subsequent sensorimotor impairments, patient is at great risk for skin breakdown.   - Counseled on continued frequency of weight shifting q2hrs and to avoid shearing with transfers or other positionally related movements to prevent development of skin breakdown.      Nociceptive Pain: ***    Ortho: ***      Follow up: ***    Please note that this dictation was  created using voice recognition software. I have made every reasonable attempt to correct obvious errors but there may be errors of grammar and content that I may have overlooked prior to finalization of this note.    Dr. Isabel Medina DO, MS  Department of Physical Medicine & Rehabilitation  Neuro Rehabilitation Clinic  [facility]

## 2023-03-16 ENCOUNTER — OFFICE VISIT (OUTPATIENT)
Dept: PHYSICAL MEDICINE AND REHAB | Facility: MEDICAL CENTER | Age: 33
End: 2023-03-16
Payer: MEDICAID

## 2023-03-16 VITALS
WEIGHT: 228.1 LBS | HEIGHT: 76 IN | DIASTOLIC BLOOD PRESSURE: 82 MMHG | HEART RATE: 77 BPM | OXYGEN SATURATION: 97 % | TEMPERATURE: 99.4 F | RESPIRATION RATE: 17 BRPM | SYSTOLIC BLOOD PRESSURE: 122 MMHG | BODY MASS INDEX: 27.78 KG/M2

## 2023-03-16 DIAGNOSIS — M21.70 LEG LENGTH DISCREPANCY: ICD-10-CM

## 2023-03-16 DIAGNOSIS — G80.8 CEREBRAL PALSY, HEMIPLEGIC (HCC): ICD-10-CM

## 2023-03-16 DIAGNOSIS — Z87.81 S/P LEFT HIP FRACTURE: Primary | ICD-10-CM

## 2023-03-16 PROCEDURE — 99215 OFFICE O/P EST HI 40 MIN: CPT | Performed by: PHYSICAL MEDICINE & REHABILITATION

## 2023-03-16 RX ORDER — TRAZODONE HYDROCHLORIDE 50 MG/1
TABLET ORAL
COMMUNITY
Start: 2023-03-09

## 2023-03-16 ASSESSMENT — FIBROSIS 4 INDEX: FIB4 SCORE: 0.24

## 2023-03-16 ASSESSMENT — PATIENT HEALTH QUESTIONNAIRE - PHQ9
SUM OF ALL RESPONSES TO PHQ QUESTIONS 1-9: 13
5. POOR APPETITE OR OVEREATING: 2 - MORE THAN HALF THE DAYS
CLINICAL INTERPRETATION OF PHQ2 SCORE: 4

## 2023-03-16 NOTE — PROGRESS NOTES
[facility], operated by [facility]  [facility]  [address] [place] [address] [place] [address]  Ph: [phone]    NEW PATIENT EVALUATION    *Patient established in PM&R practice, however, patient new to writer as patient is transferring care. Therefore, patient billed as established.     Patient Name: Cheng Foster   Patient : 1990  Patient Age: 32 y.o.     Examining Physician: Dr. Isabel Medina, DO    PM&R History to Date - Background Information:  Dates of Admission/Discharge to ARU: 2022 - 2022    SUBJECTIVE:   Patient Identification: Cheng Foster is a 32 y.o. male with PMH significant for seizure disorder, CP with chronic left hemiparesis and spasticity and rehabilitation history significant for physical deconditioning secondary to left hip fracture after GLF 2022 s/p percutaneous pinning 4/15/2022 Dr. Becker and is presenting to PM&R clinic for a NEW OUTPATIENT evaluation with the following chief complaint/s:    Chief Complaint: ARU discharge follow-up    Accompanied by Today: Parents  History of Present Illness:    - Records reviewed: Acute rehab discharge summary reviewed in its entirety.  - He has been doing OP PT in [place]. It has been going well.   - Has been increased to using a cane.   - Still following with Orthopedics in April.   - He had a little bit of compression because of not sticking to the NWB status.   - Mom has been home with him since December. She is not working now.   - He normally doesn't require assistive device.   - Sitting too long instigates his pain. More than 10-15 minutes aggravates his pain.   - He has a taller bed now because they thought it would be easier to get in and out of and limit pain, helps a little bit.   - They moved to a town house during this and has been going up and down stairs.   - For pain he isnt taking anything. He was given oxy for pain but he has 5 of 7 pills left.   - Has been living with his dad past multiple  years.   - He had surgery to the left foot, but was not braced consistently and now has contracture.   - Hand and wrist contracted, had not braced wrist consistently.   - Has not had any problems with swelling. His scar has good movement and no bursitis issues. Has been using Vitamin E.   - Stairs are giving him a little more stretch.   - Had been serially casted in past through [facility].   - Shower chair is broken, they are unable to get one screw in.     Review of Systems:  All other pertinent positive review of systems are noted above in HPI.   All other systems reviewed and are negative.    Past Medical History:  No past medical history on file.   History reviewed. No pertinent surgical history.     Current Outpatient Medications:     traZODone (DESYREL) 50 MG Tab, , Disp: , Rfl:     benztropine (COGENTIN) 2 MG Tab, Take 1 Tablet by mouth at bedtime., Disp: 30 Tablet, Rfl: 0    OXcarbazepine (TRILEPTAL) 300 MG Tab, Take 3 Tablets by mouth every day., Disp: 30 Tablet, Rfl: 0    vitamin D3 (VITAMIND D3) 1000 UNIT Tab, Take 1 Tablet by mouth every day., Disp: 30 Tablet, Rfl: 0    ARIPiprazole (ABILIFY) 10 MG Tab, Take 10 mg by mouth every day., Disp: , Rfl:     citalopram (CELEXA) 40 MG Tab, Take 40 mg by mouth every day., Disp: , Rfl:   Allergies   Allergen Reactions    Codeine     Versed Anaphylaxis        Past Social History:  Social History     Socioeconomic History    Marital status: Single     Spouse name: Not on file    Number of children: Not on file    Years of education: Not on file    Highest education level: Not on file   Occupational History    Not on file   Tobacco Use    Smoking status: Never    Smokeless tobacco: Not on file   Vaping Use    Vaping Use: Never used   Substance and Sexual Activity    Alcohol use: Not on file    Drug use: Not on file    Sexual activity: Not on file   Other Topics Concern    Not on file   Social History Narrative    Not on file     Social Determinants of Health      Financial Resource Strain: Not on file   Food Insecurity: Not on file   Transportation Needs: No Transportation Needs    Lack of Transportation (Medical): No    Lack of Transportation (Non-Medical): No   Physical Activity: Not on file   Stress: Not on file   Social Connections: Not on file   Intimate Partner Violence: Not on file   Housing Stability: Not on file        Family History:  History reviewed. No pertinent family history.    Depression and Opioid Screening  PHQ-9:      12/27/2022     9:00 PM 12/28/2022     9:02 AM 3/16/2023     9:45 AM   Depression Screen (PHQ-2/PHQ-9)   PHQ-2 Total Score 0 0    PHQ-2 Total Score   4   PHQ-9 Total Score   13     Interpretation of PHQ-9 Total Score   Score Severity   1-4 No Depression   5-9 Mild Depression   10-14 Moderate Depression   15-19 Moderately Severe Depression   20-27 Severe Depression     OBJECTIVE:   Vital Signs:  Vitals:    03/16/23 0944   BP: 122/82   Pulse: 77   Resp: 17   Temp: 37.4 °C (99.4 °F)   SpO2: 97%        Pertinent Labs:  Lab Results   Component Value Date/Time    SODIUM 133 (L) 12/28/2022 05:55 AM    POTASSIUM 3.8 12/28/2022 05:55 AM    CHLORIDE 98 12/28/2022 05:55 AM    CO2 27 12/28/2022 05:55 AM    GLUCOSE 83 12/28/2022 05:55 AM    BUN 10 12/28/2022 05:55 AM    CREATININE 0.64 12/28/2022 05:55 AM    GLOMRATE 119 12/16/2022 04:21 AM       Lab Results   Component Value Date/Time    HBA1C 5.8 (H) 12/21/2022 06:33 AM       Lab Results   Component Value Date/Time    WBC 5.6 12/28/2022 05:55 AM    RBC 4.35 (L) 12/28/2022 05:55 AM    HEMOGLOBIN 13.7 (L) 12/28/2022 05:55 AM    HEMATOCRIT 40.3 (L) 12/28/2022 05:55 AM    MCV 92.6 12/28/2022 05:55 AM    MCH 31.5 12/28/2022 05:55 AM    MCHC 34.0 12/28/2022 05:55 AM    MPV 9.5 12/28/2022 05:55 AM    NEUTSPOLYS 58.30 12/21/2022 06:33 AM    LYMPHOCYTES 27.20 12/21/2022 06:33 AM    MONOCYTES 8.40 12/21/2022 06:33 AM    EOSINOPHILS 4.70 12/21/2022 06:33 AM    BASOPHILS 0.60 12/21/2022 06:33 AM       Lab  Results   Component Value Date/Time    ASTSGOT 19 12/28/2022 05:55 AM    ALTSGPT 46 12/28/2022 05:55 AM        Physical Exam:   GEN: No apparent distress  HEENT: Head normocephalic, atraumatic.  Sclera nonicteric bilaterally, no ocular discharge appreciated bilaterally.  CV: Extremities warm and well-perfused, no peripheral edema appreciated bilaterally.  PULMONARY: Breathing nonlabored on room air, no respiratory accessory muscle use.  Not requiring supplemental oxygen.  SKIN: No appreciable skin breakdown on exposed areas of skin.  PSYCH: Mood and affect within normal limits.  NEURO: Awake alert.  Conversational.  Logical thought content.  Answers questions appropriately.  Significant spasticity and contracture in the left upper and lower extremity.  Ambulatory with a cane at this time.  Ambulates on the metatarsal heads of his left foot.    Imaging:   X-ray left hip and pelvis 12/13/2022  1.  Minimally displaced fracture of the left femoral neck.     ASSESSMENT/PLAN: Cheng Foster  is a 32 y.o. male with rehabilitation history significant for physical deconditioning secondary to left hip fracture after GLF 12/13/2022 s/p percutaneous pinning 4/15/2022 Dr. Becker, here for evaluation. The following plan was discussed with the patient who is in agreement.     Visit Diagnoses     ICD-10-CM   1. S/p left hip fracture  Z87.81   2. Cerebral palsy, hemiplegic (HCC)  G80.8   3. Leg length discrepancy  M21.70      Mom assists with history    Rehab/Neuro/Ortho:   Physical deconditioning secondary to left hip fracture after GLF 12/13/2022 s/p percutaneous pinning 4/15/2022 Dr. Becker  Left hemiplegic spastic cerebral palsy  Leg length discrepancy  Multiple muscle contractures  -Records reviewed as aforementioned in the HPI.  - Referral: Physical therapy at [facility]  - Continue Ortho follow-up, has an appointment in April.  -Prior level of function: Independent with ambulation.  - Current level of  function: Requires single-point cane for ambulation  - Referral: Orthotist for consideration of shoe lift  -Counseled that the utility of any type of intervention to fix contractures at this point would likely lead to very complicated surgeries with complicated recoveries.  He has already gone through many surgeries in the past and unfortunately has had regression of his contracture due to inadequate splinting/bracing.      Follow up: PRN - Equipment needs    Total time spent was 42 minutes.  Included in this time is the time spent preparing for the visit including record review, my exam and evaluation, counseling and education regarding that which is aforementioned in the assessment and plan.  Time was spent documenting into patient's electronic health record.  Time was spent ordering the appropriate labs, tests, procedures, referrals, medications.  Including this time was also the time spent in care coordination. Included this time as the time spent obtaining history from someone other than the patient.  Some of the time included occurred outside of charting time.  Discussion involved the patient and mom.    Please note that this dictation was created using voice recognition software. I have made every reasonable attempt to correct obvious errors but there may be errors of grammar and content that I may have overlooked prior to finalization of this note.    Dr. Isabel Medina DO, MS  Department of Physical Medicine & Rehabilitation  [facility]  [facility]